# Patient Record
Sex: FEMALE | Race: WHITE | Employment: FULL TIME | URBAN - METROPOLITAN AREA
[De-identification: names, ages, dates, MRNs, and addresses within clinical notes are randomized per-mention and may not be internally consistent; named-entity substitution may affect disease eponyms.]

---

## 2017-11-21 ENCOUNTER — HOSPITAL ENCOUNTER (OUTPATIENT)
Dept: SURGERY | Age: 42
Discharge: HOME | End: 2017-11-21
Attending: DENTIST
Payer: COMMERCIAL

## 2017-11-21 VITALS
WEIGHT: 129.9 LBS | TEMPERATURE: 98.7 F | DIASTOLIC BLOOD PRESSURE: 69 MMHG | BODY MASS INDEX: 22.18 KG/M2 | HEIGHT: 64 IN | HEART RATE: 83 BPM | SYSTOLIC BLOOD PRESSURE: 118 MMHG | OXYGEN SATURATION: 97 % | RESPIRATION RATE: 16 BRPM

## 2017-11-21 LAB
ANION GAP SERPL CALC-SCNC: 12 MMOL/L (ref 7–16)
BUN SERPL-MCNC: 13 MG/DL (ref 6–23)
CALCIUM SERPL-MCNC: 9.4 MG/DL (ref 8.3–10.4)
CHLORIDE SERPL-SCNC: 103 MMOL/L (ref 98–107)
CO2 SERPL-SCNC: 24 MMOL/L (ref 21–32)
CREAT SERPL-MCNC: 0.87 MG/DL (ref 0.6–1)
ERYTHROCYTE [DISTWIDTH] IN BLOOD BY AUTOMATED COUNT: 13 % (ref 11.9–14.6)
GLUCOSE SERPL-MCNC: 132 MG/DL (ref 65–100)
HCT VFR BLD AUTO: 39.4 % (ref 35.8–46.3)
HGB BLD-MCNC: 13.2 G/DL (ref 11.7–15.4)
MCH RBC QN AUTO: 28.8 PG (ref 26.1–32.9)
MCHC RBC AUTO-ENTMCNC: 33.5 G/DL (ref 31.4–35)
MCV RBC AUTO: 85.8 FL (ref 79.6–97.8)
PLATELET # BLD AUTO: 266 K/UL (ref 150–450)
PMV BLD AUTO: 9.5 FL (ref 10.8–14.1)
POTASSIUM SERPL-SCNC: 3.7 MMOL/L (ref 3.5–5.1)
RBC # BLD AUTO: 4.59 M/UL (ref 4.05–5.25)
SODIUM SERPL-SCNC: 139 MMOL/L (ref 136–145)
WBC # BLD AUTO: 8.8 K/UL (ref 4.3–11.1)

## 2017-11-21 PROCEDURE — 80048 BASIC METABOLIC PNL TOTAL CA: CPT | Performed by: ANESTHESIOLOGY

## 2017-11-21 PROCEDURE — 85027 COMPLETE CBC AUTOMATED: CPT | Performed by: ANESTHESIOLOGY

## 2017-11-21 RX ORDER — LANOLIN ALCOHOL/MO/W.PET/CERES
400 CREAM (GRAM) TOPICAL DAILY
COMMUNITY

## 2017-11-21 NOTE — PERIOP NOTES
Lab results within limits per anesthesia protocol; OK for surgery.      Recent Results (from the past 12 hour(s))   CBC W/O DIFF    Collection Time: 11/21/17  3:52 PM   Result Value Ref Range    WBC 8.8 4.3 - 11.1 K/uL    RBC 4.59 4.05 - 5.25 M/uL    HGB 13.2 11.7 - 15.4 g/dL    HCT 39.4 35.8 - 46.3 %    MCV 85.8 79.6 - 97.8 FL    MCH 28.8 26.1 - 32.9 PG    MCHC 33.5 31.4 - 35.0 g/dL    RDW 13.0 11.9 - 14.6 %    PLATELET 729 669 - 264 K/uL    MPV 9.5 (L) 10.8 - 44.2 FL   METABOLIC PANEL, BASIC    Collection Time: 11/21/17  3:52 PM   Result Value Ref Range    Sodium 139 136 - 145 mmol/L    Potassium 3.7 3.5 - 5.1 mmol/L    Chloride 103 98 - 107 mmol/L    CO2 24 21 - 32 mmol/L    Anion gap 12 7 - 16 mmol/L    Glucose 132 (H) 65 - 100 mg/dL    BUN 13 6 - 23 MG/DL    Creatinine 0.87 0.6 - 1.0 MG/DL    GFR est AA >60 >60 ml/min/1.73m2    GFR est non-AA >60 >60 ml/min/1.73m2    Calcium 9.4 8.3 - 10.4 MG/DL

## 2017-11-21 NOTE — PERIOP NOTES
Patient verified name, , and surgery as listed in [facility]. Type 3 surgery, PAT walk in assessment complete. Labs per surgeon: no orders. Labs per anesthesia protocol: cbc, bmp; results- pending. EKG: none needed per anesthesia guidelines. Hibiclens and instructions given per hospital policy. Patient provided with and instructed on educational handouts including Guide to Surgery, Pain Management, Hand Hygiene, Blood Transfusion Education, and [facility] Brochure. Patient answered medical/surgical history questions at their best of ability. All prior to admission medications documented in [facility]. Original medication prescription bottle not visualized during patient appointment. Patient instructed to hold all vitamins 7 days prior to surgery and NSAIDS 5 days prior to surgery, patient verbalized understanding. Medications to be held: vitamins. Patient instructed to continue previous medications as prescribed prior to surgery and to take the following medications the day of surgery according to anesthesia guidelines with a small sip of water: inhaler. Patient teach back successful and patient demonstrates knowledge of instructions.

## 2017-11-28 ENCOUNTER — ANESTHESIA EVENT (OUTPATIENT)
Dept: SURGERY | Age: 42
DRG: 132 | End: 2017-11-28
Payer: COMMERCIAL

## 2017-11-28 RX ORDER — SODIUM CHLORIDE 0.9 % (FLUSH) 0.9 %
5-10 SYRINGE (ML) INJECTION EVERY 8 HOURS
Status: CANCELLED | OUTPATIENT
Start: 2017-11-28

## 2017-11-28 RX ORDER — MIDAZOLAM HYDROCHLORIDE 1 MG/ML
2 INJECTION, SOLUTION INTRAMUSCULAR; INTRAVENOUS ONCE
Status: CANCELLED | OUTPATIENT
Start: 2017-11-28 | End: 2017-11-28

## 2017-11-28 RX ORDER — SODIUM CHLORIDE 0.9 % (FLUSH) 0.9 %
5-10 SYRINGE (ML) INJECTION AS NEEDED
Status: CANCELLED | OUTPATIENT
Start: 2017-11-28

## 2017-11-28 RX ORDER — FENTANYL CITRATE 50 UG/ML
100 INJECTION, SOLUTION INTRAMUSCULAR; INTRAVENOUS ONCE
Status: CANCELLED | OUTPATIENT
Start: 2017-11-28 | End: 2017-11-28

## 2017-11-28 RX ORDER — DEXAMETHASONE SODIUM PHOSPHATE 100 MG/10ML
10 INJECTION INTRAMUSCULAR; INTRAVENOUS
Status: CANCELLED | OUTPATIENT
Start: 2017-11-29

## 2017-11-28 NOTE — H&P
OMFS H&P Note    Lloyd Zavala is a very sweet 38year old white female that I have known for many years. She was originally sent to me by her Orthodontist, Dr Genaro Worley., back in 2009 for evaluation and treatment of her skeletal discrepancies. Secondary to the nature of the procedure and the eventual hospital stay, it is best to treat her with GA in the OR.       PMHx: Asthma, Seasonal Allergies  PSHx: Right arm radius fracture in 2013, Gingival grafting, Childbirth X 2  Meds: Zoloft, Zyrtec, Albuterol INH, ASA 81 mg, Methylfolate, Vit D  All: NKDA  SHx: Wine socially - denies tobacco and PSA  FHx: HTN      PE: Awake, alert and oriented white female of equal proportion - in NAD at the time of her examination      HEENT: Obvious facial asymmetry, opens mouth wide, extends neck well, uvula visualized  CV:  RRR  Lungs:  CTA and equal bilaterally  Abd:  Soft, NT/ND with BS  Ext:  Motor 5/5 throughout  Neuro:  CN V and VII are intact grossly      A/P: Lloyd Zavala is a 38year old white female with skeletal asymmetries, maxillary hypoplasia, and mandibular hyperplasia    Plan for GA in the OR for [place] and BSSRO - eventual Inpatient/two night stay  Cleocin 600 mg IV and Decadron 10 mg IV OCTOR  Anesthesia to pre-op      Please call with questions - [phone]          DD

## 2017-11-29 ENCOUNTER — ANESTHESIA (OUTPATIENT)
Dept: SURGERY | Age: 42
DRG: 132 | End: 2017-11-29
Payer: COMMERCIAL

## 2017-11-29 ENCOUNTER — HOSPITAL ENCOUNTER (INPATIENT)
Age: 42
LOS: 2 days | Discharge: HOME | DRG: 132 | End: 2017-12-01
Attending: DENTIST | Admitting: DENTIST
Payer: COMMERCIAL

## 2017-11-29 PROBLEM — M26.11 MAXILLARY ASYMMETRY: Status: ACTIVE | Noted: 2017-11-29

## 2017-11-29 LAB — HCG UR QL: NEGATIVE

## 2017-11-29 PROCEDURE — 77030020269 HC MISC IMPL: Performed by: DENTIST

## 2017-11-29 PROCEDURE — C1713 ANCHOR/SCREW BN/BN,TIS/BN: HCPCS | Performed by: DENTIST

## 2017-11-29 PROCEDURE — 81025 URINE PREGNANCY TEST: CPT

## 2017-11-29 PROCEDURE — 77030006812 HC BLD SAW RECIP STRY -B: Performed by: DENTIST

## 2017-11-29 PROCEDURE — 76060000040 HC ANESTHESIA 4.5 TO 5 HR: Performed by: DENTIST

## 2017-11-29 PROCEDURE — 74011250637 HC RX REV CODE- 250/637: Performed by: DENTIST

## 2017-11-29 PROCEDURE — 65270000029 HC RM PRIVATE

## 2017-11-29 PROCEDURE — 74011250636 HC RX REV CODE- 250/636: Performed by: ANESTHESIOLOGY

## 2017-11-29 PROCEDURE — 77030002986 HC SUT PROL J&J -A: Performed by: DENTIST

## 2017-11-29 PROCEDURE — 77030002888 HC SUT CHRMC J&J -A: Performed by: DENTIST

## 2017-11-29 PROCEDURE — 77030008683 HC TU ET CUF COVD -A: Performed by: ANESTHESIOLOGY

## 2017-11-29 PROCEDURE — 77030004413 HC BUR OVL STRY -B: Performed by: DENTIST

## 2017-11-29 PROCEDURE — 77030034849: Performed by: DENTIST

## 2017-11-29 PROCEDURE — 74011250636 HC RX REV CODE- 250/636: Performed by: DENTIST

## 2017-11-29 PROCEDURE — 77030020782 HC GWN BAIR PAWS FLX 3M -B: Performed by: ANESTHESIOLOGY

## 2017-11-29 PROCEDURE — 74011250636 HC RX REV CODE- 250/636

## 2017-11-29 PROCEDURE — 77030018836 HC SOL IRR NACL ICUM -A: Performed by: DENTIST

## 2017-11-29 PROCEDURE — 74011000250 HC RX REV CODE- 250: Performed by: DENTIST

## 2017-11-29 PROCEDURE — 74011000258 HC RX REV CODE- 258: Performed by: DENTIST

## 2017-11-29 PROCEDURE — 77030004385 HC BUR FISS STRY -A: Performed by: DENTIST

## 2017-11-29 PROCEDURE — 77030002936 HC SUT MERS J&J -A: Performed by: DENTIST

## 2017-11-29 PROCEDURE — 77030004368 HC BUR DENT STRY -B: Performed by: DENTIST

## 2017-11-29 PROCEDURE — 77030010514 HC APPL CLP LIG COVD -B: Performed by: DENTIST

## 2017-11-29 PROCEDURE — 77030032490 HC SLV COMPR SCD KNE COVD -B: Performed by: DENTIST

## 2017-11-29 PROCEDURE — 77030028843 HC ELECTRD CAUT TIP MEGA -B: Performed by: DENTIST

## 2017-11-29 PROCEDURE — 77030016570 HC BLNKT BAIR HGGR 3M -B: Performed by: ANESTHESIOLOGY

## 2017-11-29 PROCEDURE — 74011000250 HC RX REV CODE- 250: Performed by: ANESTHESIOLOGY

## 2017-11-29 PROCEDURE — 77030011640 HC PAD GRND REM COVD -A: Performed by: DENTIST

## 2017-11-29 PROCEDURE — 74011250637 HC RX REV CODE- 250/637: Performed by: ANESTHESIOLOGY

## 2017-11-29 PROCEDURE — 76210000016 HC OR PH I REC 1 TO 1.5 HR: Performed by: DENTIST

## 2017-11-29 PROCEDURE — 77030003887 HC BIT DRL TWST KLSM -B: Performed by: DENTIST

## 2017-11-29 PROCEDURE — 77030002996 HC SUT SLK J&J -A: Performed by: DENTIST

## 2017-11-29 PROCEDURE — 74011000250 HC RX REV CODE- 250

## 2017-11-29 PROCEDURE — 76010000176 HC OR TIME 4.5 TO 5 HR INTENSV-TIER 1: Performed by: DENTIST

## 2017-11-29 PROCEDURE — 77030031139 HC SUT VCRL2 J&J -A: Performed by: DENTIST

## 2017-11-29 DEVICE — SCREW BNE L11MM DIA2MM TI NONLOCKING MAXDRIVE: Type: IMPLANTABLE DEVICE | Site: MOUTH | Status: FUNCTIONAL

## 2017-11-29 DEVICE — SCREW BNE L5MM DIA2.3MM TI EMGCY SELF RET FOR OSTEOSYN: Type: IMPLANTABLE DEVICE | Site: MOUTH | Status: FUNCTIONAL

## 2017-11-29 DEVICE — SCREW BNE L7MM DIA2MM NEURO TI LEV 1 SELF RET DRL FREE: Type: IMPLANTABLE DEVICE | Site: MOUTH | Status: FUNCTIONAL

## 2017-11-29 DEVICE — IMPLANTABLE DEVICE: Type: IMPLANTABLE DEVICE | Site: MOUTH | Status: FUNCTIONAL

## 2017-11-29 DEVICE — SCREW BNE L9MM DIA2MM TI NONLOCKING MAXDRIVE: Type: IMPLANTABLE DEVICE | Site: MOUTH | Status: FUNCTIONAL

## 2017-11-29 DEVICE — WIRE ORTH 1.1MM DIA 229MM SMOOTH DBL BAYNT TIP S STL K: Type: IMPLANTABLE DEVICE | Site: MOUTH | Status: FUNCTIONAL

## 2017-11-29 DEVICE — SCREW BNE L13MM DIA2MM TI NONLOCKING MAXDRIVE: Type: IMPLANTABLE DEVICE | Site: MOUTH | Status: FUNCTIONAL

## 2017-11-29 DEVICE — SCREW BNE L5MM DIA2MM NEURO TI LEV 1 SELF RET DRL FREE: Type: IMPLANTABLE DEVICE | Site: MOUTH | Status: FUNCTIONAL

## 2017-11-29 RX ORDER — LABETALOL HYDROCHLORIDE 5 MG/ML
INJECTION, SOLUTION INTRAVENOUS AS NEEDED
Status: DISCONTINUED | OUTPATIENT
Start: 2017-11-29 | End: 2017-11-29 | Stop reason: HOSPADM

## 2017-11-29 RX ORDER — IBUPROFEN 400 MG/1
400 TABLET ORAL
Status: DISCONTINUED | OUTPATIENT
Start: 2017-11-29 | End: 2017-12-01 | Stop reason: HOSPADM

## 2017-11-29 RX ORDER — ACETAMINOPHEN 500 MG
1000 TABLET ORAL ONCE
Status: DISCONTINUED | OUTPATIENT
Start: 2017-11-29 | End: 2017-11-29

## 2017-11-29 RX ORDER — NEOSTIGMINE METHYLSULFATE 1 MG/ML
INJECTION INTRAVENOUS AS NEEDED
Status: DISCONTINUED | OUTPATIENT
Start: 2017-11-29 | End: 2017-11-29 | Stop reason: HOSPADM

## 2017-11-29 RX ORDER — KETOROLAC TROMETHAMINE 30 MG/ML
INJECTION, SOLUTION INTRAMUSCULAR; INTRAVENOUS AS NEEDED
Status: DISCONTINUED | OUTPATIENT
Start: 2017-11-29 | End: 2017-11-29 | Stop reason: HOSPADM

## 2017-11-29 RX ORDER — OXYMETAZOLINE HCL 0.05 %
2 SPRAY, NON-AEROSOL (ML) NASAL EVERY 12 HOURS
Status: DISCONTINUED | OUTPATIENT
Start: 2017-11-29 | End: 2017-12-01 | Stop reason: HOSPADM

## 2017-11-29 RX ORDER — ONDANSETRON 2 MG/ML
INJECTION INTRAMUSCULAR; INTRAVENOUS AS NEEDED
Status: DISCONTINUED | OUTPATIENT
Start: 2017-11-29 | End: 2017-11-29 | Stop reason: HOSPADM

## 2017-11-29 RX ORDER — ALBUTEROL SULFATE 90 UG/1
1 AEROSOL, METERED RESPIRATORY (INHALATION)
Status: CANCELLED | OUTPATIENT
Start: 2017-11-29

## 2017-11-29 RX ORDER — FLUMAZENIL 0.1 MG/ML
0.2 INJECTION INTRAVENOUS
Status: DISCONTINUED | OUTPATIENT
Start: 2017-11-29 | End: 2017-11-29

## 2017-11-29 RX ORDER — HYDROMORPHONE HYDROCHLORIDE 2 MG/ML
0.5 INJECTION, SOLUTION INTRAMUSCULAR; INTRAVENOUS; SUBCUTANEOUS
Status: DISCONTINUED | OUTPATIENT
Start: 2017-11-29 | End: 2017-11-29

## 2017-11-29 RX ORDER — MIDAZOLAM HYDROCHLORIDE 1 MG/ML
2 INJECTION, SOLUTION INTRAMUSCULAR; INTRAVENOUS
Status: COMPLETED | OUTPATIENT
Start: 2017-11-29 | End: 2017-11-29

## 2017-11-29 RX ORDER — BUPIVACAINE HYDROCHLORIDE 5 MG/ML
INJECTION, SOLUTION EPIDURAL; INTRACAUDAL AS NEEDED
Status: DISCONTINUED | OUTPATIENT
Start: 2017-11-29 | End: 2017-11-29 | Stop reason: HOSPADM

## 2017-11-29 RX ORDER — LIDOCAINE HYDROCHLORIDE 20 MG/ML
INJECTION, SOLUTION EPIDURAL; INFILTRATION; INTRACAUDAL; PERINEURAL AS NEEDED
Status: DISCONTINUED | OUTPATIENT
Start: 2017-11-29 | End: 2017-11-29 | Stop reason: HOSPADM

## 2017-11-29 RX ORDER — LIDOCAINE HYDROCHLORIDE 10 MG/ML
0.1 INJECTION INFILTRATION; PERINEURAL AS NEEDED
Status: DISCONTINUED | OUTPATIENT
Start: 2017-11-29 | End: 2017-11-29 | Stop reason: HOSPADM

## 2017-11-29 RX ORDER — ALBUTEROL SULFATE 90 UG/1
1 AEROSOL, METERED RESPIRATORY (INHALATION)
COMMUNITY

## 2017-11-29 RX ORDER — ONDANSETRON 2 MG/ML
4 INJECTION INTRAMUSCULAR; INTRAVENOUS
Status: DISCONTINUED | OUTPATIENT
Start: 2017-11-29 | End: 2017-12-01 | Stop reason: HOSPADM

## 2017-11-29 RX ORDER — ISOPROPYL ALCOHOL 70 ML/100ML
LIQUID TOPICAL AS NEEDED
Status: DISCONTINUED | OUTPATIENT
Start: 2017-11-29 | End: 2017-11-29 | Stop reason: HOSPADM

## 2017-11-29 RX ORDER — EPINEPHRINE 1 MG/ML
INJECTION, SOLUTION, CONCENTRATE INTRAVENOUS AS NEEDED
Status: DISCONTINUED | OUTPATIENT
Start: 2017-11-29 | End: 2017-11-29 | Stop reason: HOSPADM

## 2017-11-29 RX ORDER — HYDROMORPHONE HYDROCHLORIDE 2 MG/ML
1 INJECTION, SOLUTION INTRAMUSCULAR; INTRAVENOUS; SUBCUTANEOUS
Status: DISCONTINUED | OUTPATIENT
Start: 2017-11-29 | End: 2017-12-01 | Stop reason: HOSPADM

## 2017-11-29 RX ORDER — OXYCODONE HYDROCHLORIDE 5 MG/1
5 TABLET ORAL
Status: DISCONTINUED | OUTPATIENT
Start: 2017-11-29 | End: 2017-11-29

## 2017-11-29 RX ORDER — DEXTROSE MONOHYDRATE AND SODIUM CHLORIDE 5; .45 G/100ML; G/100ML
75 INJECTION, SOLUTION INTRAVENOUS CONTINUOUS
Status: DISCONTINUED | OUTPATIENT
Start: 2017-11-29 | End: 2017-11-30

## 2017-11-29 RX ORDER — OXYMETAZOLINE HCL 0.05 %
SPRAY, NON-AEROSOL (ML) NASAL AS NEEDED
Status: DISCONTINUED | OUTPATIENT
Start: 2017-11-29 | End: 2017-11-29 | Stop reason: HOSPADM

## 2017-11-29 RX ORDER — DIPHENHYDRAMINE HYDROCHLORIDE 50 MG/ML
12.5 INJECTION, SOLUTION INTRAMUSCULAR; INTRAVENOUS
Status: DISCONTINUED | OUTPATIENT
Start: 2017-11-29 | End: 2017-11-29

## 2017-11-29 RX ORDER — OXYCODONE HYDROCHLORIDE 5 MG/1
10 TABLET ORAL
Status: DISCONTINUED | OUTPATIENT
Start: 2017-11-29 | End: 2017-11-29

## 2017-11-29 RX ORDER — HYDROCODONE BITARTRATE AND ACETAMINOPHEN 7.5; 325 MG/15ML; MG/15ML
10 SOLUTION ORAL
Status: DISCONTINUED | OUTPATIENT
Start: 2017-11-29 | End: 2017-12-01 | Stop reason: HOSPADM

## 2017-11-29 RX ORDER — SODIUM CHLORIDE 0.9 % (FLUSH) 0.9 %
5-10 SYRINGE (ML) INJECTION AS NEEDED
Status: DISCONTINUED | OUTPATIENT
Start: 2017-11-29 | End: 2017-11-29

## 2017-11-29 RX ORDER — OXYMETAZOLINE HCL 0.05 %
2 SPRAY, NON-AEROSOL (ML) NASAL ONCE
Status: COMPLETED | OUTPATIENT
Start: 2017-11-29 | End: 2017-11-29

## 2017-11-29 RX ORDER — ACETAMINOPHEN 10 MG/ML
INJECTION, SOLUTION INTRAVENOUS AS NEEDED
Status: DISCONTINUED | OUTPATIENT
Start: 2017-11-29 | End: 2017-11-29 | Stop reason: HOSPADM

## 2017-11-29 RX ORDER — DEXAMETHASONE SODIUM PHOSPHATE 100 MG/10ML
10 INJECTION INTRAMUSCULAR; INTRAVENOUS EVERY 8 HOURS
Status: COMPLETED | OUTPATIENT
Start: 2017-11-29 | End: 2017-11-30

## 2017-11-29 RX ORDER — SODIUM CHLORIDE, SODIUM LACTATE, POTASSIUM CHLORIDE, CALCIUM CHLORIDE 600; 310; 30; 20 MG/100ML; MG/100ML; MG/100ML; MG/100ML
100 INJECTION, SOLUTION INTRAVENOUS CONTINUOUS
Status: DISCONTINUED | OUTPATIENT
Start: 2017-11-29 | End: 2017-11-29 | Stop reason: HOSPADM

## 2017-11-29 RX ORDER — FENTANYL CITRATE 50 UG/ML
INJECTION, SOLUTION INTRAMUSCULAR; INTRAVENOUS AS NEEDED
Status: DISCONTINUED | OUTPATIENT
Start: 2017-11-29 | End: 2017-11-29 | Stop reason: HOSPADM

## 2017-11-29 RX ORDER — PROPOFOL 10 MG/ML
INJECTION, EMULSION INTRAVENOUS AS NEEDED
Status: DISCONTINUED | OUTPATIENT
Start: 2017-11-29 | End: 2017-11-29 | Stop reason: HOSPADM

## 2017-11-29 RX ORDER — GLYCOPYRROLATE 0.2 MG/ML
INJECTION INTRAMUSCULAR; INTRAVENOUS AS NEEDED
Status: DISCONTINUED | OUTPATIENT
Start: 2017-11-29 | End: 2017-11-29 | Stop reason: HOSPADM

## 2017-11-29 RX ORDER — NALOXONE HYDROCHLORIDE 0.4 MG/ML
0.2 INJECTION, SOLUTION INTRAMUSCULAR; INTRAVENOUS; SUBCUTANEOUS AS NEEDED
Status: DISCONTINUED | OUTPATIENT
Start: 2017-11-29 | End: 2017-11-29

## 2017-11-29 RX ORDER — SODIUM CHLORIDE, SODIUM LACTATE, POTASSIUM CHLORIDE, CALCIUM CHLORIDE 600; 310; 30; 20 MG/100ML; MG/100ML; MG/100ML; MG/100ML
100 INJECTION, SOLUTION INTRAVENOUS CONTINUOUS
Status: DISCONTINUED | OUTPATIENT
Start: 2017-11-29 | End: 2017-11-29

## 2017-11-29 RX ORDER — ROCURONIUM BROMIDE 10 MG/ML
INJECTION, SOLUTION INTRAVENOUS AS NEEDED
Status: DISCONTINUED | OUTPATIENT
Start: 2017-11-29 | End: 2017-11-29 | Stop reason: HOSPADM

## 2017-11-29 RX ORDER — DEXAMETHASONE SODIUM PHOSPHATE 4 MG/ML
INJECTION, SOLUTION INTRA-ARTICULAR; INTRALESIONAL; INTRAMUSCULAR; INTRAVENOUS; SOFT TISSUE AS NEEDED
Status: DISCONTINUED | OUTPATIENT
Start: 2017-11-29 | End: 2017-11-29 | Stop reason: HOSPADM

## 2017-11-29 RX ORDER — TRIAMCINOLONE ACETONIDE 55 UG/1
SPRAY, METERED NASAL
COMMUNITY
Start: 2016-02-26 | End: 2019-03-08

## 2017-11-29 RX ADMIN — MIDAZOLAM HYDROCHLORIDE 2 MG: 1 INJECTION, SOLUTION INTRAMUSCULAR; INTRAVENOUS at 08:12

## 2017-11-29 RX ADMIN — NEOSTIGMINE METHYLSULFATE 3 MG: 1 INJECTION INTRAVENOUS at 12:33

## 2017-11-29 RX ADMIN — FENTANYL CITRATE 50 MCG: 50 INJECTION, SOLUTION INTRAMUSCULAR; INTRAVENOUS at 09:24

## 2017-11-29 RX ADMIN — SODIUM CHLORIDE, SODIUM LACTATE, POTASSIUM CHLORIDE, AND CALCIUM CHLORIDE: 600; 310; 30; 20 INJECTION, SOLUTION INTRAVENOUS at 09:31

## 2017-11-29 RX ADMIN — LIDOCAINE HYDROCHLORIDE 100 MG: 20 INJECTION, SOLUTION EPIDURAL; INFILTRATION; INTRACAUDAL; PERINEURAL at 08:54

## 2017-11-29 RX ADMIN — DEXTROSE MONOHYDRATE AND SODIUM CHLORIDE 75 ML/HR: 5; .45 INJECTION, SOLUTION INTRAVENOUS at 16:07

## 2017-11-29 RX ADMIN — HYDROMORPHONE HYDROCHLORIDE 1 MG: 2 INJECTION, SOLUTION INTRAMUSCULAR; INTRAVENOUS; SUBCUTANEOUS at 21:46

## 2017-11-29 RX ADMIN — Medication 10 ML: at 16:09

## 2017-11-29 RX ADMIN — FENTANYL CITRATE 25 MCG: 50 INJECTION, SOLUTION INTRAMUSCULAR; INTRAVENOUS at 11:56

## 2017-11-29 RX ADMIN — FENTANYL CITRATE 50 MCG: 50 INJECTION, SOLUTION INTRAMUSCULAR; INTRAVENOUS at 09:41

## 2017-11-29 RX ADMIN — FENTANYL CITRATE 50 MCG: 50 INJECTION, SOLUTION INTRAMUSCULAR; INTRAVENOUS at 09:34

## 2017-11-29 RX ADMIN — KETOROLAC TROMETHAMINE 30 MG: 30 INJECTION, SOLUTION INTRAMUSCULAR; INTRAVENOUS at 12:33

## 2017-11-29 RX ADMIN — DEXAMETHASONE SODIUM PHOSPHATE 10 MG: 10 INJECTION INTRAMUSCULAR; INTRAVENOUS at 16:06

## 2017-11-29 RX ADMIN — Medication 2 SPRAY: at 21:00

## 2017-11-29 RX ADMIN — PROPOFOL 200 MG: 10 INJECTION, EMULSION INTRAVENOUS at 08:54

## 2017-11-29 RX ADMIN — DEXAMETHASONE SODIUM PHOSPHATE 10 MG: 10 INJECTION INTRAMUSCULAR; INTRAVENOUS at 21:45

## 2017-11-29 RX ADMIN — FENTANYL CITRATE 50 MCG: 50 INJECTION, SOLUTION INTRAMUSCULAR; INTRAVENOUS at 09:10

## 2017-11-29 RX ADMIN — FENTANYL CITRATE 25 MCG: 50 INJECTION, SOLUTION INTRAMUSCULAR; INTRAVENOUS at 12:07

## 2017-11-29 RX ADMIN — SODIUM CHLORIDE 600 MG: 900 INJECTION, SOLUTION INTRAVENOUS at 16:06

## 2017-11-29 RX ADMIN — SODIUM CHLORIDE, SODIUM LACTATE, POTASSIUM CHLORIDE, AND CALCIUM CHLORIDE: 600; 310; 30; 20 INJECTION, SOLUTION INTRAVENOUS at 08:50

## 2017-11-29 RX ADMIN — CLINDAMYCIN PHOSPHATE 600 MG: 150 INJECTION, SOLUTION, CONCENTRATE INTRAVENOUS at 09:02

## 2017-11-29 RX ADMIN — OXYMETAZOLINE HYDROCHLORIDE 2 SPRAY: 5 SPRAY NASAL at 08:07

## 2017-11-29 RX ADMIN — SODIUM CHLORIDE, SODIUM LACTATE, POTASSIUM CHLORIDE, AND CALCIUM CHLORIDE 100 ML/HR: 600; 310; 30; 20 INJECTION, SOLUTION INTRAVENOUS at 08:06

## 2017-11-29 RX ADMIN — HYDROMORPHONE HYDROCHLORIDE 1 MG: 2 INJECTION, SOLUTION INTRAMUSCULAR; INTRAVENOUS; SUBCUTANEOUS at 17:10

## 2017-11-29 RX ADMIN — ONDANSETRON 4 MG: 2 INJECTION INTRAMUSCULAR; INTRAVENOUS at 12:31

## 2017-11-29 RX ADMIN — LABETALOL HYDROCHLORIDE 10 MG: 5 INJECTION, SOLUTION INTRAVENOUS at 09:59

## 2017-11-29 RX ADMIN — FENTANYL CITRATE 50 MCG: 50 INJECTION, SOLUTION INTRAMUSCULAR; INTRAVENOUS at 08:54

## 2017-11-29 RX ADMIN — DEXAMETHASONE SODIUM PHOSPHATE 10 MG: 4 INJECTION, SOLUTION INTRA-ARTICULAR; INTRALESIONAL; INTRAMUSCULAR; INTRAVENOUS; SOFT TISSUE at 09:13

## 2017-11-29 RX ADMIN — GLYCOPYRROLATE 0.4 MG: 0.2 INJECTION INTRAMUSCULAR; INTRAVENOUS at 12:33

## 2017-11-29 RX ADMIN — HYDROMORPHONE HYDROCHLORIDE 0.5 MG: 2 INJECTION, SOLUTION INTRAMUSCULAR; INTRAVENOUS; SUBCUTANEOUS at 13:52

## 2017-11-29 RX ADMIN — ROCURONIUM BROMIDE 50 MG: 10 INJECTION, SOLUTION INTRAVENOUS at 08:54

## 2017-11-29 RX ADMIN — HYDROMORPHONE HYDROCHLORIDE 0.5 MG: 2 INJECTION, SOLUTION INTRAMUSCULAR; INTRAVENOUS; SUBCUTANEOUS at 13:57

## 2017-11-29 RX ADMIN — ACETAMINOPHEN 1000 MG: 10 INJECTION, SOLUTION INTRAVENOUS at 12:33

## 2017-11-29 RX ADMIN — SODIUM CHLORIDE, SODIUM LACTATE, POTASSIUM CHLORIDE, AND CALCIUM CHLORIDE: 600; 310; 30; 20 INJECTION, SOLUTION INTRAVENOUS at 11:19

## 2017-11-29 RX ADMIN — LIDOCAINE HYDROCHLORIDE 0.1 ML: 10 INJECTION, SOLUTION INFILTRATION; PERINEURAL at 08:06

## 2017-11-29 NOTE — ANESTHESIA PREPROCEDURE EVALUATION
Anesthetic History   No history of anesthetic complications            Review of Systems / Medical History  Patient summary reviewed and pertinent labs reviewed    Pulmonary            Asthma : well controlled       Neuro/Psych   Within defined limits           Cardiovascular                  Exercise tolerance: >4 METS     GI/Hepatic/Renal  Within defined limits              Endo/Other  Within defined limits           Other Findings              Physical Exam    Airway  Mallampati: I  TM Distance: 4 - 6 cm  Neck ROM: normal range of motion   Mouth opening: Normal     Cardiovascular    Rhythm: regular  Rate: normal         Dental         Pulmonary  Breath sounds clear to auscultation               Abdominal         Other Findings            Anesthetic Plan    ASA: 2  Anesthesia type: general          Induction: Intravenous  Anesthetic plan and risks discussed with: Patient and Spouse

## 2017-11-29 NOTE — IP AVS SNAPSHOT
[address] 
[phone] Patient: Ham Mejia MRN: [id number] :1975 My Medications STOP taking these medications   
 sertraline 50 mg tablet Commonly known as:  ZOLOFT  
   
  
  
TAKE these medications as instructed Instructions Each Dose to Equal  
 Morning Noon Evening Bedtime  
 albuterol 90 mcg/actuation inhaler Commonly known as:  PROVENTIL HFA, VENTOLIN HFA, PROAIR HFA Your last dose was: Your next dose is: Take 1 Puff by inhalation. 1 Puff  
    
   
   
   
  
 amoxicillin 250 mg/5 mL suspension Commonly known as:  AMOXIL Your last dose was: Your next dose is: Take 10 mL by mouth three (3) times daily for 7 days. 500 mg  
    
   
   
   
  
 aspirin 81 mg chewable tablet Your last dose was: Your next dose is: Take 81 mg by mouth nightly. 81 mg  
    
   
   
   
  
 docosahexanoic acid 200 mg Cap capsule Commonly known as:  Lidia Griffin Your last dose was: Your next dose is: Take 1 Tab by mouth daily. 1 Tab  
    
   
   
   
  
 folic acid 048 mcg tablet Your last dose was: Your next dose is: Take 400 mcg by mouth daily. 400 mcg HYDROcodone-acetaminophen 0.5-21.7 mg/mL oral solution Commonly known as:  HYCET Your last dose was: Your next dose is: Take 15-30 mL by mouth four (4) times daily as needed for Pain. Max Daily Amount: 60 mg.  
 15-30 mL  
    
   
   
   
  
 ibuprofen 800 mg tablet Commonly known as:  MOTRIN Your last dose was: Your next dose is: Take 1 Tab by mouth every eight (8) hours as needed for Pain (Patient may crush tablet). 800 mg  
    
   
   
   
  
 multivitamin tablet Commonly known as:  ONE A DAY Your last dose was: Your next dose is: Take 1 Tab by mouth daily. 1 Tab  
    
   
   
   
  
 ondansetron 4 mg disintegrating tablet Commonly known as:  ZOFRAN ODT Your last dose was: Your next dose is: Take 1 Tab by mouth every eight (8) hours as needed for Nausea. 4 mg * QVAR 80 mcg/actuation Aero Generic drug:  beclomethasone Your last dose was: Your next dose is: Take 1 Puff by inhalation two (2) times daily as needed. 1 Puff * beclomethasone 80 mcg/actuation Aero Commonly known as:  QVAR Your last dose was: Your next dose is: Take 1 Puff by inhalation. 1 Puff  
    
   
   
   
  
 triamcinolone 55 mcg nasal inhaler Commonly known as:  NASACORT AQ Your last dose was: Your next dose is:    
   
   
 by Nasal route. VITAMIN D3 1,000 unit tablet Generic drug:  cholecalciferol Your last dose was: Your next dose is: Take  by mouth daily. ZENCHENT FE 0.4mg-35mcg(21) and 75 mg (7) Chew Generic drug:  Noreth-Ethinyl Estradiol-Iron Your last dose was: Your next dose is: Take 1 Tab by mouth daily. Active pills only, skip placebos 1 Tab ZyrTEC 10 mg Cap Generic drug:  Cetirizine Your last dose was: Your next dose is: Take 1 Tab by mouth nightly. 1 Tab * Notice: This list has 2 medication(s) that are the same as other medications prescribed for you. Read the directions carefully, and ask your doctor or other care provider to review them with you. Where to Get Your Medications Information on where to get these meds will be given to you by the nurse or doctor. ! Ask your nurse or doctor about these medications  
  amoxicillin 250 mg/5 mL suspension HYDROcodone-acetaminophen 0.5-21.7 mg/mL oral solution  
 ibuprofen 800 mg tablet  
 ondansetron 4 mg disintegrating tablet

## 2017-11-29 NOTE — PROGRESS NOTES
TRANSFER - IN REPORT:    Verbal report received from Siva(name) on Sammi Reyes  being received from [facility]) for routine progression of care      Report consisted of patients Situation, Background, Assessment and   Recommendations(SBAR). Information from the following report(s) SBAR, OR Summary and Procedure Summary was reviewed with the receiving nurse. Opportunity for questions and clarification was provided. Assessment completed upon patients arrival to unit and care assumed.

## 2017-11-29 NOTE — IP AVS SNAPSHOT
[address] [facility] 
 
 
 300 [facility] [address] 
[phone] Patient: Da Rojo MRN: [id number] :1975 About your hospitalization You were admitted on:  2017 You last received care in the:  [facility] 3M You were discharged on:  2017 Why you were hospitalized Your primary diagnosis was:  Not on File Your diagnoses also included:  Maxillary Asymmetry Things You Need To Do (next 8 weeks) Follow up with Alex Diaz MD  
  
Phone:  [phone] [facility]:  [facility] [address] Follow up with Marino Abarca MD  
  
Phone:  [phone] Where:  [address], [address] [place] [address] Avenue, INTERNAL [address] [facility] [facility] [address] Discharge Orders None A check adam indicates which time of day the medication should be taken. My Medications STOP taking these medications   
 sertraline 50 mg tablet Commonly known as:  ZOLOFT  
   
  
  
TAKE these medications as instructed Instructions Each Dose to Equal  
 Morning Noon Evening Bedtime  
 albuterol 90 mcg/actuation inhaler Commonly known as:  PROVENTIL HFA, VENTOLIN HFA, PROAIR HFA Your last dose was: Your next dose is: Take 1 Puff by inhalation. 1 Puff  
    
   
   
   
  
 amoxicillin 250 mg/5 mL suspension Commonly known as:  AMOXIL Your last dose was: Your next dose is: Take 10 mL by mouth three (3) times daily for 7 days. 500 mg  
    
   
   
   
  
 aspirin 81 mg chewable tablet Your last dose was: Your next dose is: Take 81 mg by mouth nightly. 81 mg  
    
   
   
   
  
 docosahexanoic acid 200 mg Cap capsule Commonly known as:  Tanya Estrella Your last dose was: Your next dose is: Take 1 Tab by mouth daily. 1 Tab folic acid 456 mcg tablet Your last dose was: Your next dose is: Take 400 mcg by mouth daily. 400 mcg HYDROcodone-acetaminophen 0.5-21.7 mg/mL oral solution Commonly known as:  HYCET Your last dose was: Your next dose is: Take 15-30 mL by mouth four (4) times daily as needed for Pain. Max Daily Amount: 60 mg.  
 15-30 mL  
    
   
   
   
  
 ibuprofen 800 mg tablet Commonly known as:  MOTRIN Your last dose was: Your next dose is: Take 1 Tab by mouth every eight (8) hours as needed for Pain (Patient may crush tablet). 800 mg  
    
   
   
   
  
 multivitamin tablet Commonly known as:  ONE A DAY Your last dose was: Your next dose is: Take 1 Tab by mouth daily. 1 Tab  
    
   
   
   
  
 ondansetron 4 mg disintegrating tablet Commonly known as:  ZOFRAN ODT Your last dose was: Your next dose is: Take 1 Tab by mouth every eight (8) hours as needed for Nausea. 4 mg * QVAR 80 mcg/actuation Aero Generic drug:  beclomethasone Your last dose was: Your next dose is: Take 1 Puff by inhalation two (2) times daily as needed. 1 Puff * beclomethasone 80 mcg/actuation Aero Commonly known as:  QVAR Your last dose was: Your next dose is: Take 1 Puff by inhalation. 1 Puff  
    
   
   
   
  
 triamcinolone 55 mcg nasal inhaler Commonly known as:  NASACORT AQ Your last dose was: Your next dose is:    
   
   
 by Nasal route. VITAMIN D3 1,000 unit tablet Generic drug:  cholecalciferol Your last dose was: Your next dose is: Take  by mouth daily. ZENCHENT FE 0.4mg-35mcg(21) and 75 mg (7) Chew Generic drug:  Noreth-Ethinyl Estradiol-Iron Your last dose was: Your next dose is: Take 1 Tab by mouth daily. Active pills only, skip placebos 1 Tab ZyrTEC 10 mg Cap Generic drug:  Cetirizine Your last dose was: Your next dose is: Take 1 Tab by mouth nightly. 1 Tab * Notice: This list has 2 medication(s) that are the same as other medications prescribed for you. Read the directions carefully, and ask your doctor or other care provider to review them with you. Where to Get Your Medications Information on where to get these meds will be given to you by the nurse or doctor. ! Ask your nurse or doctor about these medications  
  amoxicillin 250 mg/5 mL suspension HYDROcodone-acetaminophen 0.5-21.7 mg/mL oral solution  
 ibuprofen 800 mg tablet  
 ondansetron 4 mg disintegrating tablet Discharge Instructions DISCHARGE SUMMARY from Nurse The following personal items are in your possession at time of discharge: 
 
Dental Appliances: Other (comment) (braces) Visual Aid: Glasses Home Medications: None Jewelry: None Clothing: Shirt, Pants, Footwear, Undergarments Other Valuables: [employer] PATIENT INSTRUCTIONS: 
 
After general anesthesia or intravenous sedation, for 24 hours or while taking prescription Narcotics: · Limit your activities · Do not drive and operate hazardous machinery · Do not make important personal or business decisions · Do  not drink alcoholic beverages · If you have not urinated within 8 hours after discharge, please contact your surgeon on call. Report the following to your surgeon: 
· Excessive pain, swelling, redness or odor of or around the surgical area · Temperature over 100.5 · Nausea and vomiting lasting longer than 4 hours or if unable to take medications · Any signs of decreased circulation or nerve impairment to extremity: change in color, persistent  numbness, tingling, coldness or increase pain · Any questions What to do at Home: 
Recommended activity: Activity as tolerated, per MD 
 
If you experience any of the following symptoms fever>101, pain unrelieved with medication, nausea/vomiting, shortness of breath, dizziness/fainting, chest pain. , please follow up with your doctor. *  Please give a list of your current medications to your Primary Care Provider. *  Please update this list whenever your medications are discontinued, doses are 
    changed, or new medications (including over-the-counter products) are added. *  Please carry medication information at all times in case of emergency situations. These are general instructions for a healthy lifestyle: No smoking/ No tobacco products/ Avoid exposure to second hand smoke Surgeon General's Warning:  Quitting smoking now greatly reduces serious risk to your health. Obesity, smoking, and sedentary lifestyle greatly increases your risk for illness A healthy diet, regular physical exercise & weight monitoring are important for maintaining a healthy lifestyle You may be retaining fluid if you have a history of heart failure or if you experience any of the following symptoms:  Weight gain of 3 pounds or more overnight or 5 pounds in a week, increased swelling in our hands or feet or shortness of breath while lying flat in bed. Please call your doctor as soon as you notice any of these symptoms; do not wait until your next office visit. Recognize signs and symptoms of STROKE: 
 
F-face looks uneven A-arms unable to move or move unevenly S-speech slurred or non-existent T-time-call 911 as soon as signs and symptoms begin-DO NOT go Back to bed or wait to see if you get better-TIME IS BRAIN. Warning Signs of HEART ATTACK Call 911 if you have these symptoms: ? Chest discomfort. Most heart attacks involve discomfort in the center of the chest that lasts more than a few minutes, or that goes away and comes back. It can feel like uncomfortable pressure, squeezing, fullness, or pain. ? Discomfort in other areas of the upper body. Symptoms can include pain or discomfort in one or both arms, the back, neck, jaw, or stomach. ? Shortness of breath with or without chest discomfort. ? Other signs may include breaking out in a cold sweat, nausea, or lightheadedness. Don't wait more than five minutes to call [address]! Fast action can save your life. Calling 911 is almost always the fastest way to get lifesaving treatment. Emergency Medical Services staff can begin treatment when they arrive  up to an hour sooner than if someone gets to the hospital by car. The discharge information has been reviewed with the patient. The patient verbalized understanding. Discharge medications reviewed with the patient and appropriate educational materials and side effects teaching were provided. Introducing [facility] SERVICES! Dear Lloyd Zavala: Thank you for requesting a Yesweplay account. Our records indicate that you already have an active Yesweplay account. You can access your account anytime at [email or web address] Did you know that you can access your hospital and ER discharge instructions at any time in Yesweplay? You can also review all of your test results from your hospital stay or ER visit. Additional Information If you have questions, please visit the Frequently Asked Questions section of the Yesweplay website at [email or web address] Remember, Yesweplay is NOT to be used for urgent needs. For medical emergencies, dial 911. Now available from your iPhone and Android! Providers Seen During Your Hospitalization Provider Specialty Primary office phone Dmitriy Swenson MD Oral Surgery [phone] Immunizations Administered for This Admission Name Date Influenza Vaccine (Quad) PF 11/30/2017 Your Primary Care Physician (PCP) Primary Care Physician Office Phone Office Fax [address] [phone] You are allergic to the following Allergen Reactions Celery Anaphylaxis In combination with exercise Inhalants Unknown (comments) Zenon Ramos Recent Documentation Height Weight BMI OB Status Smoking Status 1.626 m 58.5 kg 22.14 kg/m2 Having regular periods Never Smoker Emergency Contacts Name Discharge Info Relation Home Work Mobile Ismael Reyes DISCHARGE CAREGIVER [3] Spouse [3]   [phone] Patient Belongings The following personal items are in your possession at time of discharge: 
  Dental Appliances: Other (comment) (braces)  Visual Aid: Glasses      Home Medications: None   Jewelry: None  Clothing: Shirt, Pants, Footwear, Undergarments    Other Valuables: Eyeglasses Discharge Instructions Attachments/References FACIAL TRAUMA REPAIR: POST-OP (ENGLISH) Patient Handouts Facial Trauma Repair: What to Expect at Home Your Recovery Facial trauma repair is surgery to fix an injury to the face or jaw. The surgery may have been done to stop bleeding, repair damaged tissue, or fix broken bones. Your face may be swollen and bruised. It may take 5 to 7 days for the swelling to go down, and 10 to 14 days for the bruising to fade. It may be hard to eat at first. 
If you have stitches, the doctor may need to remove them about a week after surgery. It will probably take a few months for you to heal after surgery. Your face may look different than it did before your injury. Sometimes more surgery is needed later to help make your face look as close to how it did before the injury as possible. When you can return to work depends on the injury you had and what type of work you do. You may be able to go back to work in 1 to 2 weeks. This care sheet gives you a general idea about how long it will take for you to recover. But each person recovers at a different pace. Follow the steps below to get better as quickly as possible. How can you care for yourself at home? Activity ? · Rest when you feel tired. Getting enough sleep will help you recover. Sleep with your head up by using two or three pillows. You can also try to sleep with your head up in a reclining chair. ? · Avoid any activity that might re-injure your face or jaw, until your doctor says it is okay. ? · Follow your doctor's instructions for cleaning your teeth and mouth. ? · Ask your doctor when you can drive again. ? · You will probably need to take at least 1 to 2 weeks off from work. But you may need to take longer off work, depending on your injury and the type of work you do. Diet ? · Follow your doctor's advice about what you can eat. You may need to eat a soft diet, or you may have to drink your meals through a straw. ? · Drink plenty of fluids to avoid becoming dehydrated. Medicines ? · Your doctor will tell you if and when you can restart your medicines. He or she will also give you instructions about taking any new medicines. ? · If you take blood thinners, such as warfarin (Coumadin), clopidogrel (Plavix), or aspirin, be sure to talk to your doctor. He or she will tell you if and when to start taking those medicines again. Make sure that you understand exactly what your doctor wants you to do. ? · Be safe with medicines. Take pain medicines exactly as directed. ¨ If the doctor gave you a prescription medicine for pain, take it as prescribed. ¨ If you are not taking a prescription pain medicine, ask your doctor if you can take an over-the-counter medicine. ? · If you think your pain medicine is making you sick to your stomach: 
¨ Take your pain medicine after meals (unless your doctor has told you not to). ¨ Ask your doctor for a different pain medicine. ? · If your doctor prescribed antibiotics, take them as directed. Do not stop taking them just because you feel better. You need to take the full course of antibiotics. Incision care ? · If you have an incision, or cuts or scrapes on your face, wash the area daily with warm, soapy water, and pat it dry. ? · Your doctor may give you other instructions about how to care for your incision. Follow your doctor's instructions exactly. ?Ice ? · Put ice or a cold pack on your face or jaw for 10 to 20 minutes at a time. Try to do this 3 or more times a day for 10 to 20 minutes at a time. Put a thin cloth between the ice and your skin. Other instructions ? · If your jaw is wired shut, keep wire cutters with you at all times in case you throw up. Your doctor will show you how to use them. Follow-up care is a key part of your treatment and safety. Be sure to make and go to all appointments, and call your doctor if you are having problems. It's also a good idea to know your test results and keep a list of the medicines you take. When should you call for help? Call 911 anytime you think you may need emergency care. For example, call if: 
? · You passed out (lost consciousness). ? · You have severe trouble breathing. ? · You have sudden chest pain and shortness of breath, or you cough up blood. ?Call your doctor now or seek immediate medical care if: 
? · You have pain that does not get better after you take pain medicine. ? · You have loose stitches, or your incision comes open. ? · You are bleeding from the incision. ? · You have signs of infection, such as: 
¨ Increased pain, swelling, warmth, or redness. ¨ Red streaks leading from the incision. ¨ Pus draining from the incision. ¨ A fever. ? · You have signs of a blood clot in your leg, such as: 
¨ Pain in your calf, back of the knee, thigh, or groin. ¨ Redness and swelling in your leg or groin. ? · You have trouble talking or swallowing. ? · Your mouth is bleeding. ? Watch closely for any changes in your health, and be sure to contact your doctor if: 
? · You do not get better as expected. Where can you learn more? Go to http://roberto carlos-robbi.info/. Enter I429 in the search box to learn more about \"Facial Trauma Repair: What to Expect at Home. \" Current as of: October 13, 2016 Content Version: 11.4 © 8507-8338 Healthwise, Inc[employer]. Care instructions adapted under license by [employer] (which disclaims liability or warranty for this information). If you have questions about a medical condition or this instruction, always ask your healthcare professional. [address] any warranty or liability for your use of this information. Please provide this summary of care documentation to your next provider. Signatures-by signing, you are acknowledging that this After Visit Summary has been reviewed with you and you have received a copy. Patient Signature:  ____________________________________________________________ Date:  ____________________________________________________________  
  
Annalise Allen Provider Signature:  ____________________________________________________________ Date:  ____________________________________________________________

## 2017-11-29 NOTE — PERIOP NOTES
Attempted to update pt's  on surgery progress, but he left his number with the  in the surgery waiting area. Attempted to call his number, but it went to voicemail and the mailbox was full and could not receive any messages.  Teodoro Ge

## 2017-11-29 NOTE — ANESTHESIA POSTPROCEDURE EVALUATION
Post-Anesthesia Evaluation and Assessment    Patient: Army Moncada MRN: [id number]  SSN: [id number]    YOB: 1975  Age: 38 y.o. Sex: female       Cardiovascular Function/Vital Signs  Visit Vitals    /58    Pulse 80    Temp 37.5 °C (99.5 °F)    Resp 16    Ht 5' 4\" (1.626 m)    Wt 58.5 kg (129 lb)    SpO2 95%    BMI 22.14 kg/m2       Patient is status post general anesthesia for Procedure(s):  MAXILLARY LEFORTE I OSTEOTOMY  BILATERAL SAGITTAL SPLIT MANDIBULAR OSTEOTOMY. Nausea/Vomiting: None    Postoperative hydration reviewed and adequate. Pain:  Pain Scale 1: Numeric (0 - 10) (11/29/17 1520)  Pain Intensity 1: 0 (11/29/17 1427)   Managed    Neurological Status:   Neuro (WDL): Exceptions to WDL (11/29/17 1417)  Neuro  Neurologic State: Appropriate for age; Alert (11/29/17 1520)  Orientation Level: Oriented X4 (11/29/17 1520)   At baseline    Mental Status and Level of Consciousness: Arousable    Pulmonary Status:   O2 Device: Nasal cannula (11/29/17 1520)   Adequate oxygenation and airway patent    Complications related to anesthesia: None    Post-anesthesia assessment completed.  No concerns    Signed By: Bassam Mullen MD     November 29, 2017

## 2017-11-30 PROCEDURE — 74011250637 HC RX REV CODE- 250/637: Performed by: DENTIST

## 2017-11-30 PROCEDURE — 74011250636 HC RX REV CODE- 250/636: Performed by: DENTIST

## 2017-11-30 PROCEDURE — 0NSV04Z REPOSITION LEFT MANDIBLE WITH INTERNAL FIXATION DEVICE, OPEN APPROACH: ICD-10-PCS | Performed by: DENTIST

## 2017-11-30 PROCEDURE — 90471 IMMUNIZATION ADMIN: CPT

## 2017-11-30 PROCEDURE — 0NST04Z REPOSITION RIGHT MANDIBLE WITH INTERNAL FIXATION DEVICE, OPEN APPROACH: ICD-10-PCS | Performed by: DENTIST

## 2017-11-30 PROCEDURE — 74011000250 HC RX REV CODE- 250: Performed by: DENTIST

## 2017-11-30 PROCEDURE — 0NSR04Z REPOSITION MAXILLA WITH INTERNAL FIXATION DEVICE, OPEN APPROACH: ICD-10-PCS | Performed by: DENTIST

## 2017-11-30 PROCEDURE — 74011000258 HC RX REV CODE- 258: Performed by: DENTIST

## 2017-11-30 PROCEDURE — 90686 IIV4 VACC NO PRSV 0.5 ML IM: CPT | Performed by: DENTIST

## 2017-11-30 PROCEDURE — 65270000029 HC RM PRIVATE

## 2017-11-30 RX ADMIN — DEXAMETHASONE SODIUM PHOSPHATE 10 MG: 10 INJECTION INTRAMUSCULAR; INTRAVENOUS at 06:02

## 2017-11-30 RX ADMIN — SODIUM CHLORIDE 600 MG: 900 INJECTION, SOLUTION INTRAVENOUS at 00:09

## 2017-11-30 RX ADMIN — HYDROCODONE BITARTRATE AND ACETAMINOPHEN 10 MG: 7.5; 325 SOLUTION ORAL at 08:36

## 2017-11-30 RX ADMIN — Medication 2 SPRAY: at 08:24

## 2017-11-30 RX ADMIN — INFLUENZA VIRUS VACCINE 0.5 ML: 15; 15; 15; 15 SUSPENSION INTRAMUSCULAR at 08:31

## 2017-11-30 RX ADMIN — IBUPROFEN 400 MG: 400 TABLET, FILM COATED ORAL at 18:42

## 2017-11-30 RX ADMIN — Medication 2 SPRAY: at 21:00

## 2017-11-30 RX ADMIN — DEXTROSE MONOHYDRATE AND SODIUM CHLORIDE 75 ML/HR: 5; .45 INJECTION, SOLUTION INTRAVENOUS at 04:14

## 2017-11-30 RX ADMIN — HYDROCODONE BITARTRATE AND ACETAMINOPHEN 10 MG: 7.5; 325 SOLUTION ORAL at 12:39

## 2017-11-30 RX ADMIN — HYDROCODONE BITARTRATE AND ACETAMINOPHEN 10 MG: 7.5; 325 SOLUTION ORAL at 20:08

## 2017-11-30 RX ADMIN — HYDROCODONE BITARTRATE AND ACETAMINOPHEN 10 MG: 7.5; 325 SOLUTION ORAL at 00:08

## 2017-11-30 RX ADMIN — HYDROCODONE BITARTRATE AND ACETAMINOPHEN 10 MG: 7.5; 325 SOLUTION ORAL at 16:09

## 2017-11-30 RX ADMIN — HYDROMORPHONE HYDROCHLORIDE 1 MG: 2 INJECTION, SOLUTION INTRAMUSCULAR; INTRAVENOUS; SUBCUTANEOUS at 04:10

## 2017-11-30 RX ADMIN — SODIUM CHLORIDE 600 MG: 900 INJECTION, SOLUTION INTRAVENOUS at 08:24

## 2017-11-30 NOTE — PROGRESS NOTES
Shift assessment complete. Patient alert and oriented x 4. Respirations even, regular, lung sounds clear. Bowel sounds active in all 4 quadrants. Abdomen soft and non-tender. Ice pack to bilateral jaws. IV fluids infusing well and without difficulty. Bed low, locked, and call light within reach.

## 2017-11-30 NOTE — PROGRESS NOTES
Pt reported she walked in the hallway and tolerated it well. Pt also voided freely about 2-3 times, tolerated full liquid diet.

## 2017-11-30 NOTE — PROGRESS NOTES
Assessment done via doc flow sheet. Pt resting in bed, HOB elevated, resp easy & regular, lungs CTA bilaterally. Jaw bra to bilateral jaws with ice in place. Complained of 5/10 jaw pain, Hycet 10 mg po given as ordered. Pt informed me she is taking her own oral prednisone as per Dr. Elda Beasley instructions to her. Bed low & locked, side rails x3 up with call light within reach, pt instructed to call for assistance as needed.

## 2017-11-30 NOTE — PROGRESS NOTES
Patient enquired when MD will see pt again and requests IV removed. Called Dr. Evelyn Velez office and was told MD will come see pt at around 5:30-6:00 pm.   Relayed above information to pt.

## 2017-11-30 NOTE — BRIEF OP NOTE
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]  Maxillary Asymmetry  Mandibular hyperplasia [M26.03]    Procedure(s):  MAXILLARY LEFORTE I OSTEOTOMY  BILATERAL SAGITTAL SPLIT MANDIBULAR OSTEOTOMY  Surgeon(s) and Role:     * Wen Calix MD - Primary     * Haja Seth MD - Assisting         Assistant Staff:       Surgical Staff:  Circ-1: Margarita High RN  Circ-Relief: Shlomo Evans RN  Scrub Tech-1: Kizzy Solano  Scrub Tech-Relief: Wali Victoria  Event Time In   Incision Start 3802   Incision Close 1321     Anesthesia: General   Estimated Blood Loss: 250 mL  Fluids:  2100 mL of crystalloid Urine:  200 mL  Specimens: * No specimens in log *   Findings: None  Complications: None  Implants:   Implant Name Type Inv. Item Serial No.  Lot No. LRB No. Used Action   WIRE FIX K 2 TRCR [id number] 9CM --  - [id number]  WIRE FIX K 2 TRCR 1.9BXJ49. 9CM --  [id number]  1 Implanted   PLATE BNE L LP 8.3ZG MED L TI --  - [id number]  PLATE BNE L LP 6.8HV MED L TI --   [facility] 2LOAD4 [id number]  2 Implanted   PLATE BNE FX LP L-SHAPED RT -- . 6MM - [id number]  PLATE BNE FX LP L-SHAPED RT -- . 6MM  [facility] 2LOAD4 [id number]  2 Implanted   SCR MINI MAXDRIVE 4.8O3NZ TI --  - [id number]  SCR MINI MAXDRIVE 3.7U0AX TI --   [facility] 2LOAD4 [id number]  14 Implanted   SCR EMERG KARYN 2.3X5MM TI --  - [id number]  SCR EMERG KARYN 2.3X5MM TI --   [facility] 2LOAD4 [id number]  2 Implanted   SCR MINI MAXDRIVE 2.4A80ZD TI --  - [id number]  SCR MINI MAXDRIVE 3.5N00XR TI --   [facility] 2LOAD4 [id number]  2 Implanted   SCR MINI MAXDRIVE 7.2S45LW TI --  - [id number]  SCR MINI MAXDRIVE 3.7B67LG TI --   KLS LUCIANO LP 2LOAD4 [id number]  2 Implanted   SCR MINI MAXDRIVE 0.6T9MS TI --  - [id number]  SCR MINI MAXDRIVE 6.9E7BU TI --   KLS LUCIANO LP 2LOAD4 [id number]  2 Implanted   SCR MINI SD MAXDRIVE 5F7OC TI --  - [id number]  SCR MINI SD MAXDRIVE 4Z1MU TI --   KLS LUCIANO LP 2LOAD4 [id number]  4 Implanted   4 HOLE 0.6mm Plate         2LOAD4 [id number] N/A 1 Implanted

## 2017-11-30 NOTE — PROGRESS NOTES
Problem: Interdisciplinary Rounds  Goal: Interdisciplinary Rounds  Interdisciplinary team rounds were held 11/30/2017 with the following team members:Care Management, Nursing, Nutrition and Pharmacy and the patient. Plan of care discussed. See clinical pathway and/or care plan for interventions and desired outcomes.

## 2017-11-30 NOTE — OP NOTES
[facility] Operative Note           DATE OF SURGERY: 05/18/2016     HISTORY OF PRESENT ILLNESS: This is a very sweet 26-year-old WF that I have know for roughly eight years. She was sent to me originally as a referral from   her orthodontist, Dr Cathy Greer, for evaluation and treatment of her skeletal   discrepancies. Secondary to the nature of the procedure and the eventual   hospital stay, it is best to treat her with general anesthesia in   the operating room.     PREPROCEDURE DIAGNOSES   1. Maxillary hyperplasia. 2. Mandibular hypoplasia. 3. Maxillary Asymmetry  4. Mandibular Asymmetry       POSTPROCEDURE DIAGNOSES   1. Maxillary hyperplasia. 2. Mandibular hypoplasia. 3. Maxillary Asymmetry  4. Mandibular Asymmetry       NAME OF PROCEDURE   1. Evelyn Delio I osteotomy with rigid internal fixation, 2.0 mm KLS Isidro   plates and screws. 2. Bilateral sagittal split ramus osteotomy with rigid internal fixation,   2.0 mm KLS Isidro plates and screws.        SURGEON: Zachary Mayes. Sarah Ribeiro DMD      ASSISTING SURGEON: Moiz Clancy. Gilles Garces DMD      ASSISTANTS: Vaishali Jones, MIKAYLA     ANESTHESIA: General naso-endotracheal anesthesia.      ESTIMATED BLOOD LOSS: 250 mL.      FLUIDS: 2100 mL of Lactated Ringers      URINE: 200 mL      DRAINS: No drains placed.      COMPLICATIONS: Fracture of the proximal portion of the distal segment on the left side of the mandible. This was secured in the normal fashion with bi-cortical screws, but a tension band with mono-cortical screws was placed to further secure the segments      FINDINGS: Consistent with diagnosis.      The patient was given clindamycin 600 mg IV and Decadron 10 mg IV   preoperatively. The plan was for extubation and then PACU with transfer   to floor, inpatient stay, and discharge status to be determined later.      DESCRIPTION OF PROCEDURE: The patient was taken to the operating room and   administered a general anesthetic by the anesthesia service.  Once an adequate plane of anesthesia was obtained, the patient was successfully   nasally intubated. The tube was secured by both myself and the anesthesia   service. The patient was prepped and draped in the usual sterile fashion. A 0.5% Marcaine solution with epinephrine 1:200,000 was used across the   maxilla and the mandible throughout the case to adequately achieve local   anesthesia. A total amount of local used can be found on the anesthesia   record. An external reference adam was used by placing a 45 Giuseppe wire in the   bridge of the nose at the junction of the nasal and frontal bones. That   external reference was used throughout the case to help verify the   position of the maxilla in its new position. A series of measurements were made off of this external reference marker   that were used to help us at the completion of the repositioning of the   maxilla. A full thickness mucoperiosteal flap was initiated using Bovie   electrocautery. This circumvestibular incision was used from first   premolar to first premolar. The soft tissue flap was elevated inferiorly   to expose the maxilla, superiorly to the level of the infraorbital nerve,   medially to the piriform rim, and posteriorly to the maxillary buttress   region. The soft tissue flap was then carried posteriorly at the level of   the buttress back to the pterygoid plates. A 1 x 3 neuro soren soaked in   Afrin was then packed in this area to help facilitate hemostasis. That   same neuro soren helped to lift the nasal mucosa off the bony floor of   the nose as we dissected each side and posteriorly. That neuro soren was   then left there to help facilitate hemostasis and we were prepared to   create our osteotomy. A reciprocating saw was used starting at the maxillary buttress region   and moving medially towards the piriform rim. The deflection of that saw   blade was inferiorly as we moved from laterally to medially.  An Obmayrageser   retractor was used to protect the cheek laterally. A Molt 9 periosteal   elevator was used to help protect the nasal mucosa. That saw blade was   then turned around at the maxillary buttress region 180 degrees and was   brought laterally towards that Obwegeser retractor to help ensure that we   had good separation of the maxilla from the basal skeleton laterally. A   small thin spatula osteotome was used at the buttress region and was   carried back posteriorly to the level of the pterygoid plates. We then   used a pterygoid plate chisel to help separate the maxillary tuberosity   from the pterygoid plates. This was repeated on each side and then we   prepared to osteotomize the nose. A single guarded chisel was then used   to separate the maxilla from the cranial base by using a chisel along the   ridge of the junction of the lateral nasal wall with the medial wall of   the maxillary sinus. This was also carried posteriorly to about 30 mm to   ensure that we had good bony separation. A double-guarded chisel was then carried down the medial portion of the   maxilla to help separate the cartilaginous and bony septum from the floor   of the nose. Gentle downward and anterior pressure was used to help   create the downfracture of the maxilla. With rongeur, we removed any   sharp bony edges along the superior portion of the maxilla. Hemoclips   were used x2 on each side to help ligate the descending palatine vessels. Bovie electrocautery was then used to create this separation and to   cauterize any bleeders that we may have found following downfracture. A   Rosa M retractor was used to help mobilize the maxilla anteriorly by   placing it posterior to the maxillary tuberosity and pulling forward   gently. Packs were then placed across the posterior of the maxilla to   ensure that we had good hemostasis.  An egg-shaped bur was then used to   cross the superior aspect of the maxilla to help create a nice smooth   surface since we were planning on impacting the maxilla a few   millimeters. Attention was then turned to the inferior aspect of the nose and the   nasal septum. The nasal septum was freed up from its periosteum and   perichondrium using a Malt 9 periosteal elevator. The nasal septum was   then trimmed and aligned so that it would be in a much smoother and   straighter orientation. A portion of the bony septum was removed using a   rongeur. This incision and any tears that we had in the nasal mucosa were   repaired using a 4-0 plain gut suture on a P3 needle. The prefabricated   intermaxillary splint was placed in between the teeth and the jaw was   wired together using 26 gauge straight wire to create a complex of the   maxilla and the mandible. This complex was then rotated superiorly,   making sure that the condyles were seated properly in the fossa as we   rotated superiorly. Once we got to a good stable position, we went back   to our external reference marker and took a measurement. Once we were   satisfied with the measurement that we had obtained and the new position   of the maxilla in a vertical direction, we prepared for rigid fixation. The maxilla was then taken back down, cleaned, debrided, made sure that   any bleeders were taken care of and the bone was free of any bleeders. The maxillomandibular complex was then rotated superiorly into that same   position with good bony contact and was held there while we plated the   maxilla in its new position. The 2.0 mm KLS Isidro plates were used and bent to lay passively across   the bone. These plates were fixated using 2.0 mm KLS Isidro monocortical   screws. We used 4 on each plate and 4 plates for a total of 16 screws. Once we felt good about the bony plates, we then released her   maxillomandibular fixation wires.  We watched carefully as the mandible   fell out of the splint and then verified our occlusion to ensure this was   what we had determined, we wanted as an intermediary occlusion from our   model surgery.      We then turned our attention to the mandible. As I mentioned before, we   injected with 0.5% Marcaine solution with epinephrine 1:200,000. This   allowed us to achieve adequate long-term anesthesia. We then began with   our incision. A standard sagittal ramus incision was created from   superior to inferior using a Bovie electrocautery. Great care was taken   to ensure that the retractors shielded Stensen duct from our incision. That incision was carried through mucosa down through the submucosal   tissue, down through the muscle, periosteum, and then to bone. The soft   tissue dissection was carried inferiorly to the inferior border,   posteriorly to the posterior border, superiorly to the coronoid process,   and then medially to the level of the retrolingular fossa. As we moved   inferiorly on the medial aspect of the mandible, we made sure that we   isolated the lingula of the mandible and the entrance of the inferior   alveolar nerve to the medial surface of the mandible. We were then   prepared to create our osteotomy. That same reciprocating saw was   oriented parallel to the occlusal plane and about 2 mm superior to the   lingula of the mandible. That saw was taken into the retrolingular fossa   and brought anteriorly parallel with the occlusal plane. Once we reached   the anterior border of the ramus, we turned the saw into a sagittal plane   and brought it anterior to the level of the first and second molar of the   mandible. We then turned it laterally and inferiorly towards the   inferior border of the mandible. The saw blade was then rotated 180   degrees and we created inferior J osteotomy to ensure that the inferior   border of the mandible was adequately . Those same small thin spatula osteotomes were used to help walk the   osteotomy and ensure that it was .  Those osteotomes and Smith   spreaders were used to help walk the ostotomy to help create a separation   of the mandible into a proximal and distal half. The inferior alveolar   nerve was isolated as we  the 2 and great care was taken to   ensure that it remained intact. Following separation, we took a fiber   handle osteotome to ensure that we had good separation. Finger pressure   manipulation was used to make sure that we had good proximal and distal   halves. A J stripper was then run along the inferior border of the distal   segment to ensure that the muscular sling is  from the inferior   border of the mandible. One issue arose on the left side during separation of the mandible. Fracture of the proximal portion of the distal segment on the left side of the mandible was seen moving medially between teeth numbers 18 and 19. The GLENDY nerve bundle was in this segment so great care was taken to hold these segments as still as possible. The segments were secured in the normal fashion with bi-cortical screws, but a 2.0 mm KLS Isidro plate was used as a tension band with mono-cortical screws was used across the distal and proximal portion of the osteotomy to further secure the segments. 7 mm screws were used and two screws were placed on either side of the osteotomy. Once we completed this on each side, we were then ready for our final   splint. In much the same way we handled the intermediate splint before,   we took the prefabricated acrylic splint and wired it between the teeth   using 26 gauge straight wire. Once we had the patient in this final   position, we were then prepared to fixate the proximal segments with the   distal segments.      We allowed the proximal segment to lay   passively onto the lateral surface of the distal segment. We had to   create a passive environment so as not to deflect the condyle.  We used an   Allis clamp to hold the mandible in this position while we create a   passageway for the screws to come into the mandible at 90 degrees. A 15   blade was used to create a small stab incision through the cheek and   allow to pass our trocar through the cheek so that we can introduce the   screws perpendicular to the lateral surface of the proximal segment. Cheek guards and retractors were used to help protect the cheek as we   passed burs and screws through the cheek. A KLS Isidro depth gauge was used to verify the depth of the bone and   length of the screw. Once we got 3 solid screws on each side, we verified   that we had a good stable result by pressure with a Molt 9 periosteal   elevator. Following completion of this, the trocar was then removed from   the cheek and the patient was released from his intermaxillary fixation. We watched the mandible passively fall out of the splint. We verified the   occlusion with the splint and without the splint. A small linda bur was   used to remove a small portion of the enamel from the teeth where we had   inferences. We then reverified the occlusion. Once we were happy with   that, we prepared the patient for closure. All of the incisions were   copiously irrigated using normal saline to adequately cleanse the wound.      In the mandible, we achieved a layered closure using 4-0 Vicryl on a PS2   needle and a 3-0 chromic in a running and interrupted fashion using an SH   needle. In the maxilla, a 4-0 Mersilene was used to line up the nasal   septum. It was also used to create an alar cinch. A V-Y closure was   performed using that same 3-0 chromic on a SH needle. We also had a   layered closure in the maxilla using a 4-0 Vicryl on a PS2 and that same   3-0 chromic on an SH. The mucosal closure was both in an interrupted and   a continuous fashion.      Attention was then turned to the chin button. Two 25 gauge loop wires were used to place the patient back into inter-maxillary fixation during this portion of the procedure.   A small incision was initiated from canine to canine in the lower jaw but far enough out in the lip to allow for adequate closure. This incision was carried through mucosa, sub-mucosal layer, the musculature of the chin, and periosteum before reaching the chin button. The Med-Pore chin implant was trimmed and fashioned to fit Aníbal Cheng. It had been pre-soaked in an antibiotic saline wash until this portion of the case. Once we felt it was in the proper position, two 19 mm KLS Isidro screws from the 2.0 mm set were placed on each side of the midline to help achieve stability. We were satisfied with the position after we allowed the soft tissue to drape back over the implant. She was then rinsed and irrigated thoroughly and prepared for closure. A 3.0 Vicryl on a tapered SH needle was used to re-approximate the periosteum and sub-mucosal layers. The mucosa was closed primarily with a 3.0 Chromic Gut suture on a tapered needle. A foam tape dressing was applied to her chin to help apply pressure and allow for soft tissue to begin re-attachment. The two 25 gauge IMF wires were removed.     Under deep suction, the oropharyngeal throat pack was removed. An   orogastric tube was passed temporarily to suction the gastric contents. A   small orthodontic elastic rubber band was used on each side to help serve   as a guide for the patient to find his new occlusion. A 5-0 Prolene on a   P3 was used to close the transcutaneous incision x2 in an interrupted   fashion. Small Steri-Strips were then placed across that to help   facilitate hemostasis. A jaw bra was placed around the patient with ice   packs to help serve as a pressure dressing.      She was then turned over to the anesthesia service where she was awoken,   extubated, and transported to the PACU in stable condition.            LUPE

## 2017-11-30 NOTE — PROGRESS NOTES
OMFS POD 1 Note    Patient is asleep, easily arousable, full of energy this AM.  No events over night. \"Doing great! \"   sleeping at bedside. Music playing in the room. 102.0 F  97.8 °F (36.6 °C) 67 122/66 85 Right arm At rest 18 97 %     Awake, alert and oriented X 4  Jaw bra falling off her face - re-adjusted - needs ice  Very minimal facial swelling seen but mostly confined to her upper lip  No bruising seen at the time of this exam  CN V2 and V3 anesthesia present as expected  Some dried heme in each nare - drainage more clear and red in color  Swelling is soft and non-tender  Extends neck well - trachea is midline  No bruising or swelling in the supra-sternal area    Intra-orally Incisions are closed and hemostatic    Sutures are C/D/I    Guiding elastics are in place    Occlusion is stable and reproducible    FOM is soft and not elevated    Oropharynx not visualized    CV: RRR  Lungs: CTA and equal bilaterally  ABD: Soft, NT/ND with BS      A/P: Lloyd Zavala is a 42 yoWF that is s/p Lefort and BSSRO POD 1 - that is doing very well    I encouraged her to be OOB to chair today, ambulate in the halls today  Encouraged PO intake as well as PO pain control - she is ready  HLIV this AM  Patient is progressing very well - I thought she would - she has that personality    Possibility of discharge this PM if she continues this progress - we discussed PO intake, the pain control, all of her instructions - she understood all that I discussed with her. Please call with any questions/problems - [phone] today.           LUPE

## 2017-12-01 VITALS
BODY MASS INDEX: 22.02 KG/M2 | DIASTOLIC BLOOD PRESSURE: 90 MMHG | HEART RATE: 66 BPM | HEIGHT: 64 IN | SYSTOLIC BLOOD PRESSURE: 159 MMHG | RESPIRATION RATE: 16 BRPM | OXYGEN SATURATION: 96 % | WEIGHT: 129 LBS | TEMPERATURE: 97.6 F

## 2017-12-01 PROCEDURE — 74011250637 HC RX REV CODE- 250/637: Performed by: DENTIST

## 2017-12-01 RX ORDER — IBUPROFEN 800 MG/1
800 TABLET ORAL
Qty: 20 TAB | Refills: 1 | Status: SHIPPED | OUTPATIENT
Start: 2017-12-01 | End: 2019-03-08

## 2017-12-01 RX ORDER — HYDROCODONE BITARTRATE AND ACETAMINOPHEN 7.5; 325 MG/15ML; MG/15ML
15-30 SOLUTION ORAL
Qty: 400 ML | Refills: 0 | Status: SHIPPED | OUTPATIENT
Start: 2017-12-01 | End: 2019-03-08

## 2017-12-01 RX ORDER — AMOXICILLIN 250 MG/5ML
500 POWDER, FOR SUSPENSION ORAL 3 TIMES DAILY
Qty: 210 ML | Refills: 0 | Status: SHIPPED | OUTPATIENT
Start: 2017-12-01 | End: 2017-12-08

## 2017-12-01 RX ORDER — ONDANSETRON 4 MG/1
4 TABLET, ORALLY DISINTEGRATING ORAL
Qty: 15 TAB | Refills: 1 | Status: SHIPPED | OUTPATIENT
Start: 2017-12-01 | End: 2019-03-08

## 2017-12-01 RX ADMIN — IBUPROFEN 400 MG: 400 TABLET, FILM COATED ORAL at 03:32

## 2017-12-01 NOTE — PROGRESS NOTES
Discharge instructions given to and reviewed with pt and pts . Pt with jaw bra on, pt given extra jaw bra for discharge. Pt also given prescriptions. Pt to get packed and call when ready for discharge.

## 2017-12-01 NOTE — PROGRESS NOTES
Shift assessment complete. Patient alert and oriented x 4. Respirations even,regular, and lung sounds clear. Bowel sounds active in all 4 quadrants, abdomen soft and non-tender. Denies pain at this time. Jaw bra in place with ice. Bed low, locked, and call light with in reach.

## 2017-12-01 NOTE — PROGRESS NOTES
OMFS POD 2 Note    Patient asleep in bed, easily arousable, very energetic right away, \"doing great\" and no issues overnight. She mentioned that she broke a rubber band and replaced it from her collection that I gave to her.    98.7  96.4 °F (35.8 °C) 65 134/77 96 -- At rest 16 95 %     Awake, alert and oriented X 4  Moderate facial swelling throughout the lower jaw and mid face as expected  Swelling is soft and non-tender  CN V2 and V3 anesthesia present  Jaw bra in place with ice  Extends neck well - neck is supple and trachea is midline   Nares are dry and no active bleeding seen    Intra-orally Elastics in place    Incisions are closed and hemostatic    Occlusion is stable and reproducible    FOM is soft and not elevated    CV - RRR  Lungs - CTA and equal bilaterally  Abd - Soft NT/ND with BS      A/P: Jaskaran Linda is a 42 yoWF that is POD 2 from [employer] and BSSRO - doing well and ready for discharge    I reviewed all of the post operative instructions with her - understood  Plan for discharge today, follow up by phone this afternoon, and in office next week.     Please call with any questions/problems - [phone]          DD

## 2018-07-21 ENCOUNTER — HOSPITAL ENCOUNTER (OUTPATIENT)
Dept: MAMMOGRAPHY | Age: 43
Discharge: HOME | End: 2018-07-21
Attending: OBSTETRICS & GYNECOLOGY
Payer: COMMERCIAL

## 2018-07-21 DIAGNOSIS — Z12.31 ENCOUNTER FOR SCREENING MAMMOGRAM FOR MALIGNANT NEOPLASM OF BREAST: ICD-10-CM

## 2018-07-21 PROCEDURE — 77063 BREAST TOMOSYNTHESIS BI: CPT

## 2018-07-26 ENCOUNTER — HOSPITAL ENCOUNTER (OUTPATIENT)
Dept: MAMMOGRAPHY | Age: 43
Discharge: HOME | End: 2018-07-26
Attending: OBSTETRICS & GYNECOLOGY
Payer: COMMERCIAL

## 2018-07-26 DIAGNOSIS — R92.8 ABNORMAL SCREENING MAMMOGRAM: ICD-10-CM

## 2018-07-26 PROCEDURE — 77065 DX MAMMO INCL CAD UNI: CPT

## 2019-03-07 ENCOUNTER — HOSPITAL ENCOUNTER (OUTPATIENT)
Dept: SURGERY | Age: 44
Discharge: HOME | End: 2019-03-07

## 2019-03-08 VITALS — BODY MASS INDEX: 20.83 KG/M2 | HEIGHT: 64 IN | WEIGHT: 122 LBS

## 2019-03-08 RX ORDER — FLUTICASONE PROPIONATE 50 MCG
1 SPRAY, SUSPENSION (ML) NASAL DAILY
COMMUNITY

## 2019-03-08 NOTE — PERIOP NOTES
Patient verified name and . Order for consent NOT found in EHR; patient verifies procedure. Type 2 surgery, phone assessment complete. Orders NOT received. Labs per surgeon: unknown. No orders received in EHR at time of phone assessment. Labs per anesthesia protocol: HGB. Coming to the outpatient lab. EKG: not needed per [facility] protocols. Patient answered medical/surgical history questions at their best of ability. All prior to admission medications documented in [facility]. Patient instructed to take the following medications the day of surgery according to anesthesia guidelines with a small sip of water: flonase, ventolin if needed, qvar if needed. Hold all vitamins 7 days prior to surgery and NSAIDS 5 days prior to surgery. Prescription meds to hold: none. Instructed to bring inhalers dos. Patient instructed on the following:  Arrive at A Entrance, time of arrival to be called the day before by 1700  NPO after midnight including gum, mints, and ice chips  Responsible adult must drive patient to the hospital, stay during surgery, and patient will need supervision 24 hours after anesthesia  Use hibiclens in shower the night before surgery and on the morning of surgery  All piercings must be removed prior to arrival.    Leave all valuables (money and jewelry) at home but bring insurance card and ID on DOS. Do not wear make-up, nail polish, lotions, cologne, perfumes, powders, or oil on skin. Patient teach back successful and patient demonstrates knowledge of instruction.

## 2019-03-13 ENCOUNTER — ANESTHESIA EVENT (OUTPATIENT)
Dept: SURGERY | Age: 44
End: 2019-03-13
Payer: COMMERCIAL

## 2019-03-13 NOTE — H&P
OMFS H&P Note     Elvia Stockton is a very sweet 41year old white female that I have known for many years. We took care of Sammi back on 30 Nov 17. 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 I believe this is due to hardware failure that is quite possibly from a bad tooth. She was originally sent to me by her Orthodontist, Dr Mane Can., back in 2009 for evaluation and treatment of her skeletal discrepancies.   Secondary to the nature of the procedure and the eventual hospital stay, it is best to treat her with GA in the OR.        PMHx: Asthma, Seasonal Allergies  PSHx:  Two jaw osteotomy in 2017, Right arm radius fracture in 2013, Gingival grafting, Childbirth X 2  Meds:  Zoloft, Zyrtec, Albuterol INH, ASA 81 mg, Methylfolate, Vit D  All:       NKDA  SHx:    Wine socially - denies tobacco and PSA  FHx:     HTN        PE:      Awake, alert and oriented white female of equal proportion - in NAD at the time of her examination        HEENT:           Obvious facial asymmetry, Left side of mandible is mobile, malocclusion, opens mouth wide, extends neck well, uvula visualized  CV:                  RRR  Lungs:             CTA and equal bilaterally  Abd:                 Soft, NT/ND with BS  Ext:                  Motor 5/5 throughout  Neuro:             CN VII is intact grossly, V3 parasthesia on the left side        A/P:     Elvia Stockton is a 41year old white female with a new skeletal asymmetry and mandibular Non-union     Plan for GA in the OR for debridment, removal of infected hardware, and placement of new fixation  Possible 23 hour observation  Cleocin 600 mg IV and Decadron 10 mg IV OCTOR  Anesthesia to pre-op        Please call with questions - [phone]              DD

## 2019-03-14 ENCOUNTER — ANESTHESIA (OUTPATIENT)
Dept: SURGERY | Age: 44
End: 2019-03-14
Payer: COMMERCIAL

## 2019-03-21 ENCOUNTER — HOSPITAL ENCOUNTER (OUTPATIENT)
Age: 44
Setting detail: OBSERVATION
Discharge: HOME | End: 2019-03-22
Attending: DENTIST | Admitting: DENTIST
Payer: COMMERCIAL

## 2019-03-21 DIAGNOSIS — S02.652S: Primary | ICD-10-CM

## 2019-03-21 PROBLEM — S02.609A MANDIBLE FRACTURE (HCC): Status: ACTIVE | Noted: 2019-03-21

## 2019-03-21 LAB
HCG UR QL: NEGATIVE
HGB BLD-MCNC: 13 G/DL (ref 11.7–15.4)

## 2019-03-21 PROCEDURE — 77030032490 HC SLV COMPR SCD KNE COVD -B: Performed by: DENTIST

## 2019-03-21 PROCEDURE — 77030002888 HC SUT CHRMC J&J -A: Performed by: DENTIST

## 2019-03-21 PROCEDURE — 77030028843 HC ELECTRD CAUT TIP MEGA -B: Performed by: DENTIST

## 2019-03-21 PROCEDURE — 77030020269 HC MISC IMPL: Performed by: DENTIST

## 2019-03-21 PROCEDURE — 77030006812 HC BLD SAW RECIP STRY -B: Performed by: DENTIST

## 2019-03-21 PROCEDURE — 77030002996 HC SUT SLK J&J -A: Performed by: DENTIST

## 2019-03-21 PROCEDURE — 77030019908 HC STETH ESOPH SIMS -A: Performed by: ANESTHESIOLOGY

## 2019-03-21 PROCEDURE — 74011250636 HC RX REV CODE- 250/636: Performed by: ANESTHESIOLOGY

## 2019-03-21 PROCEDURE — 77030026485 HC BN CANC CRSH FD MUSC -E: Performed by: DENTIST

## 2019-03-21 PROCEDURE — 74011250636 HC RX REV CODE- 250/636

## 2019-03-21 PROCEDURE — 74011000250 HC RX REV CODE- 250

## 2019-03-21 PROCEDURE — 77030020268 HC MISC GENERAL SUPPLY: Performed by: DENTIST

## 2019-03-21 PROCEDURE — 74011000250 HC RX REV CODE- 250: Performed by: DENTIST

## 2019-03-21 PROCEDURE — 99218 HC RM OBSERVATION: CPT

## 2019-03-21 PROCEDURE — 76060000037 HC ANESTHESIA 3 TO 3.5 HR: Performed by: DENTIST

## 2019-03-21 PROCEDURE — 74011250636 HC RX REV CODE- 250/636: Performed by: DENTIST

## 2019-03-21 PROCEDURE — C1713 ANCHOR/SCREW BN/BN,TIS/BN: HCPCS | Performed by: DENTIST

## 2019-03-21 PROCEDURE — 77030031139 HC SUT VCRL2 J&J -A: Performed by: DENTIST

## 2019-03-21 PROCEDURE — 77030018836 HC SOL IRR NACL ICUM -A: Performed by: DENTIST

## 2019-03-21 PROCEDURE — 77030002986 HC SUT PROL J&J -A: Performed by: DENTIST

## 2019-03-21 PROCEDURE — 77030020253 HC SOL INJ D545NS .05 DEX .45 SAL

## 2019-03-21 PROCEDURE — 81025 URINE PREGNANCY TEST: CPT

## 2019-03-21 PROCEDURE — 85018 HEMOGLOBIN: CPT

## 2019-03-21 PROCEDURE — 77030034849: Performed by: DENTIST

## 2019-03-21 PROCEDURE — 76010000173 HC OR TIME 3 TO 3.5 HR INTENSV-TIER 1: Performed by: DENTIST

## 2019-03-21 PROCEDURE — 77030008683 HC TU ET CUF COVD -A: Performed by: ANESTHESIOLOGY

## 2019-03-21 PROCEDURE — 77030020782 HC GWN BAIR PAWS FLX 3M -B: Performed by: ANESTHESIOLOGY

## 2019-03-21 PROCEDURE — 77030010512 HC APPL CLP LIG J&J -C: Performed by: DENTIST

## 2019-03-21 PROCEDURE — 77030039425 HC BLD LARYNG TRULITE DISP TELE -A: Performed by: ANESTHESIOLOGY

## 2019-03-21 PROCEDURE — 77030004413 HC BUR OVL STRY -B: Performed by: DENTIST

## 2019-03-21 PROCEDURE — 77030004385 HC BUR FISS STRY -A: Performed by: DENTIST

## 2019-03-21 PROCEDURE — 74011250637 HC RX REV CODE- 250/637: Performed by: ANESTHESIOLOGY

## 2019-03-21 PROCEDURE — 77030004368 HC BUR DENT STRY -B: Performed by: DENTIST

## 2019-03-21 PROCEDURE — 74011000258 HC RX REV CODE- 258: Performed by: DENTIST

## 2019-03-21 PROCEDURE — 76210000017 HC OR PH I REC 1.5 TO 2 HR: Performed by: DENTIST

## 2019-03-21 PROCEDURE — 77030019927 HC TBNG IRR CYSTO BAXT -A: Performed by: DENTIST

## 2019-03-21 DEVICE — IMPLANTABLE DEVICE: Type: IMPLANTABLE DEVICE | Site: MANDIBLE | Status: FUNCTIONAL

## 2019-03-21 DEVICE — GRAFT BNE SUB 15ML 0.1-4MM CANC CRUSH CHIP MORSELIZED FRZ: Type: IMPLANTABLE DEVICE | Site: MANDIBLE | Status: FUNCTIONAL

## 2019-03-21 RX ORDER — HYDROMORPHONE HYDROCHLORIDE 2 MG/ML
1 INJECTION, SOLUTION INTRAMUSCULAR; INTRAVENOUS; SUBCUTANEOUS
Status: DISCONTINUED | OUTPATIENT
Start: 2019-03-21 | End: 2019-03-22 | Stop reason: HOSPADM

## 2019-03-21 RX ORDER — OXYCODONE HYDROCHLORIDE 5 MG/1
5 TABLET ORAL
Status: DISCONTINUED | OUTPATIENT
Start: 2019-03-21 | End: 2019-03-21 | Stop reason: HOSPADM

## 2019-03-21 RX ORDER — OXYMETAZOLINE HCL 0.05 %
2 SPRAY, NON-AEROSOL (ML) NASAL EVERY 12 HOURS
Status: DISCONTINUED | OUTPATIENT
Start: 2019-03-21 | End: 2019-03-22 | Stop reason: HOSPADM

## 2019-03-21 RX ORDER — OXYMETAZOLINE HCL 0.05 %
2 SPRAY, NON-AEROSOL (ML) NASAL
Status: DISCONTINUED | OUTPATIENT
Start: 2019-03-21 | End: 2019-03-21 | Stop reason: HOSPADM

## 2019-03-21 RX ORDER — ONDANSETRON 2 MG/ML
4 INJECTION INTRAMUSCULAR; INTRAVENOUS
Status: DISCONTINUED | OUTPATIENT
Start: 2019-03-21 | End: 2019-03-22 | Stop reason: HOSPADM

## 2019-03-21 RX ORDER — LIDOCAINE HYDROCHLORIDE 10 MG/ML
0.1 INJECTION INFILTRATION; PERINEURAL AS NEEDED
Status: DISCONTINUED | OUTPATIENT
Start: 2019-03-21 | End: 2019-03-21 | Stop reason: HOSPADM

## 2019-03-21 RX ORDER — IBUPROFEN 400 MG/1
400 TABLET ORAL
Status: DISCONTINUED | OUTPATIENT
Start: 2019-03-21 | End: 2019-03-22 | Stop reason: HOSPADM

## 2019-03-21 RX ORDER — DEXAMETHASONE SODIUM PHOSPHATE 100 MG/10ML
10 INJECTION INTRAMUSCULAR; INTRAVENOUS EVERY 8 HOURS
Status: DISCONTINUED | OUTPATIENT
Start: 2019-03-21 | End: 2019-03-22 | Stop reason: HOSPADM

## 2019-03-21 RX ORDER — OXYCODONE AND ACETAMINOPHEN 10; 325 MG/1; MG/1
1 TABLET ORAL
Status: DISCONTINUED | OUTPATIENT
Start: 2019-03-21 | End: 2019-03-22 | Stop reason: HOSPADM

## 2019-03-21 RX ORDER — LIDOCAINE HYDROCHLORIDE 20 MG/ML
INJECTION, SOLUTION EPIDURAL; INFILTRATION; INTRACAUDAL; PERINEURAL AS NEEDED
Status: DISCONTINUED | OUTPATIENT
Start: 2019-03-21 | End: 2019-03-21 | Stop reason: HOSPADM

## 2019-03-21 RX ORDER — NEOSTIGMINE METHYLSULFATE 1 MG/ML
INJECTION INTRAVENOUS AS NEEDED
Status: DISCONTINUED | OUTPATIENT
Start: 2019-03-21 | End: 2019-03-21 | Stop reason: HOSPADM

## 2019-03-21 RX ORDER — CLINDAMYCIN PHOSPHATE 600 MG/50ML
600 INJECTION INTRAVENOUS EVERY 8 HOURS
Status: DISCONTINUED | OUTPATIENT
Start: 2019-03-21 | End: 2019-03-22 | Stop reason: HOSPADM

## 2019-03-21 RX ORDER — FENTANYL CITRATE 50 UG/ML
INJECTION, SOLUTION INTRAMUSCULAR; INTRAVENOUS AS NEEDED
Status: DISCONTINUED | OUTPATIENT
Start: 2019-03-21 | End: 2019-03-21 | Stop reason: HOSPADM

## 2019-03-21 RX ORDER — DIPHENHYDRAMINE HYDROCHLORIDE 50 MG/ML
12.5 INJECTION, SOLUTION INTRAMUSCULAR; INTRAVENOUS
Status: ACTIVE | OUTPATIENT
Start: 2019-03-21 | End: 2019-03-22

## 2019-03-21 RX ORDER — DEXAMETHASONE SODIUM PHOSPHATE 4 MG/ML
INJECTION, SOLUTION INTRA-ARTICULAR; INTRALESIONAL; INTRAMUSCULAR; INTRAVENOUS; SOFT TISSUE AS NEEDED
Status: DISCONTINUED | OUTPATIENT
Start: 2019-03-21 | End: 2019-03-21 | Stop reason: HOSPADM

## 2019-03-21 RX ORDER — PROPOFOL 10 MG/ML
INJECTION, EMULSION INTRAVENOUS AS NEEDED
Status: DISCONTINUED | OUTPATIENT
Start: 2019-03-21 | End: 2019-03-21 | Stop reason: HOSPADM

## 2019-03-21 RX ORDER — DEXAMETHASONE SODIUM PHOSPHATE 100 MG/10ML
10 INJECTION INTRAMUSCULAR; INTRAVENOUS
Status: ACTIVE | OUTPATIENT
Start: 2019-03-21 | End: 2019-03-22

## 2019-03-21 RX ORDER — GLYCOPYRROLATE 0.2 MG/ML
INJECTION INTRAMUSCULAR; INTRAVENOUS AS NEEDED
Status: DISCONTINUED | OUTPATIENT
Start: 2019-03-21 | End: 2019-03-21 | Stop reason: HOSPADM

## 2019-03-21 RX ORDER — MIDAZOLAM HYDROCHLORIDE 1 MG/ML
2 INJECTION, SOLUTION INTRAMUSCULAR; INTRAVENOUS
Status: DISCONTINUED | OUTPATIENT
Start: 2019-03-21 | End: 2019-03-21 | Stop reason: HOSPADM

## 2019-03-21 RX ORDER — DEXTROSE MONOHYDRATE AND SODIUM CHLORIDE 5; .45 G/100ML; G/100ML
75 INJECTION, SOLUTION INTRAVENOUS CONTINUOUS
Status: DISCONTINUED | OUTPATIENT
Start: 2019-03-21 | End: 2019-03-22 | Stop reason: HOSPADM

## 2019-03-21 RX ORDER — BUPIVACAINE HYDROCHLORIDE AND EPINEPHRINE 5; 5 MG/ML; UG/ML
INJECTION, SOLUTION EPIDURAL; INTRACAUDAL; PERINEURAL AS NEEDED
Status: DISCONTINUED | OUTPATIENT
Start: 2019-03-21 | End: 2019-03-21 | Stop reason: HOSPADM

## 2019-03-21 RX ORDER — CLINDAMYCIN PHOSPHATE 600 MG/50ML
600 INJECTION INTRAVENOUS
Status: COMPLETED | OUTPATIENT
Start: 2019-03-21 | End: 2019-03-21

## 2019-03-21 RX ORDER — SODIUM CHLORIDE, SODIUM LACTATE, POTASSIUM CHLORIDE, CALCIUM CHLORIDE 600; 310; 30; 20 MG/100ML; MG/100ML; MG/100ML; MG/100ML
75 INJECTION, SOLUTION INTRAVENOUS CONTINUOUS
Status: DISCONTINUED | OUTPATIENT
Start: 2019-03-21 | End: 2019-03-21 | Stop reason: HOSPADM

## 2019-03-21 RX ORDER — HYDROMORPHONE HYDROCHLORIDE 2 MG/ML
0.5 INJECTION, SOLUTION INTRAMUSCULAR; INTRAVENOUS; SUBCUTANEOUS
Status: DISCONTINUED | OUTPATIENT
Start: 2019-03-21 | End: 2019-03-21 | Stop reason: HOSPADM

## 2019-03-21 RX ORDER — NALOXONE HYDROCHLORIDE 0.4 MG/ML
0.1 INJECTION, SOLUTION INTRAMUSCULAR; INTRAVENOUS; SUBCUTANEOUS
Status: DISCONTINUED | OUTPATIENT
Start: 2019-03-21 | End: 2019-03-21 | Stop reason: HOSPADM

## 2019-03-21 RX ORDER — DIPHENHYDRAMINE HYDROCHLORIDE 50 MG/ML
12.5 INJECTION, SOLUTION INTRAMUSCULAR; INTRAVENOUS
Status: DISCONTINUED | OUTPATIENT
Start: 2019-03-21 | End: 2019-03-21 | Stop reason: HOSPADM

## 2019-03-21 RX ORDER — FLUMAZENIL 0.1 MG/ML
0.2 INJECTION INTRAVENOUS
Status: DISCONTINUED | OUTPATIENT
Start: 2019-03-21 | End: 2019-03-21 | Stop reason: HOSPADM

## 2019-03-21 RX ORDER — SODIUM CHLORIDE, SODIUM LACTATE, POTASSIUM CHLORIDE, CALCIUM CHLORIDE 600; 310; 30; 20 MG/100ML; MG/100ML; MG/100ML; MG/100ML
100 INJECTION, SOLUTION INTRAVENOUS CONTINUOUS
Status: DISCONTINUED | OUTPATIENT
Start: 2019-03-21 | End: 2019-03-21 | Stop reason: HOSPADM

## 2019-03-21 RX ORDER — ONDANSETRON 2 MG/ML
INJECTION INTRAMUSCULAR; INTRAVENOUS AS NEEDED
Status: DISCONTINUED | OUTPATIENT
Start: 2019-03-21 | End: 2019-03-21 | Stop reason: HOSPADM

## 2019-03-21 RX ORDER — ROCURONIUM BROMIDE 10 MG/ML
INJECTION, SOLUTION INTRAVENOUS AS NEEDED
Status: DISCONTINUED | OUTPATIENT
Start: 2019-03-21 | End: 2019-03-21 | Stop reason: HOSPADM

## 2019-03-21 RX ADMIN — FENTANYL CITRATE 25 MCG: 50 INJECTION, SOLUTION INTRAMUSCULAR; INTRAVENOUS at 12:16

## 2019-03-21 RX ADMIN — HYDROMORPHONE HYDROCHLORIDE 0.5 MG: 2 INJECTION, SOLUTION INTRAMUSCULAR; INTRAVENOUS; SUBCUTANEOUS at 13:18

## 2019-03-21 RX ADMIN — NEOSTIGMINE METHYLSULFATE 1 MG: 1 INJECTION INTRAVENOUS at 12:12

## 2019-03-21 RX ADMIN — PROPOFOL 200 MG: 10 INJECTION, EMULSION INTRAVENOUS at 09:39

## 2019-03-21 RX ADMIN — CLINDAMYCIN PHOSPHATE 600 MG: 600 INJECTION, SOLUTION INTRAVENOUS at 17:14

## 2019-03-21 RX ADMIN — DEXAMETHASONE SODIUM PHOSPHATE 10 MG: 4 INJECTION, SOLUTION INTRA-ARTICULAR; INTRALESIONAL; INTRAMUSCULAR; INTRAVENOUS; SOFT TISSUE at 09:47

## 2019-03-21 RX ADMIN — HYDROMORPHONE HYDROCHLORIDE 0.5 MG: 2 INJECTION, SOLUTION INTRAMUSCULAR; INTRAVENOUS; SUBCUTANEOUS at 13:13

## 2019-03-21 RX ADMIN — FENTANYL CITRATE 100 MCG: 50 INJECTION, SOLUTION INTRAMUSCULAR; INTRAVENOUS at 09:39

## 2019-03-21 RX ADMIN — GLYCOPYRROLATE 0.2 MG: 0.2 INJECTION INTRAMUSCULAR; INTRAVENOUS at 12:01

## 2019-03-21 RX ADMIN — DEXAMETHASONE SODIUM PHOSPHATE 10 MG: 10 INJECTION INTRAMUSCULAR; INTRAVENOUS at 17:10

## 2019-03-21 RX ADMIN — ROCURONIUM BROMIDE 50 MG: 10 INJECTION, SOLUTION INTRAVENOUS at 09:39

## 2019-03-21 RX ADMIN — SODIUM CHLORIDE, SODIUM LACTATE, POTASSIUM CHLORIDE, AND CALCIUM CHLORIDE 100 ML/HR: 600; 310; 30; 20 INJECTION, SOLUTION INTRAVENOUS at 08:34

## 2019-03-21 RX ADMIN — LIDOCAINE HYDROCHLORIDE 60 MG: 20 INJECTION, SOLUTION EPIDURAL; INFILTRATION; INTRACAUDAL; PERINEURAL at 09:39

## 2019-03-21 RX ADMIN — MIDAZOLAM 2 MG: 1 INJECTION INTRAMUSCULAR; INTRAVENOUS at 09:15

## 2019-03-21 RX ADMIN — FENTANYL CITRATE 25 MCG: 50 INJECTION, SOLUTION INTRAMUSCULAR; INTRAVENOUS at 12:38

## 2019-03-21 RX ADMIN — HYDROMORPHONE HYDROCHLORIDE 0.5 MG: 2 INJECTION, SOLUTION INTRAMUSCULAR; INTRAVENOUS; SUBCUTANEOUS at 13:08

## 2019-03-21 RX ADMIN — LIDOCAINE HYDROCHLORIDE 0.1 ML: 10 INJECTION, SOLUTION INFILTRATION; PERINEURAL at 08:34

## 2019-03-21 RX ADMIN — FENTANYL CITRATE 25 MCG: 50 INJECTION, SOLUTION INTRAMUSCULAR; INTRAVENOUS at 12:40

## 2019-03-21 RX ADMIN — FENTANYL CITRATE 100 MCG: 50 INJECTION, SOLUTION INTRAMUSCULAR; INTRAVENOUS at 10:31

## 2019-03-21 RX ADMIN — GLYCOPYRROLATE 0.2 MG: 0.2 INJECTION INTRAMUSCULAR; INTRAVENOUS at 12:12

## 2019-03-21 RX ADMIN — FENTANYL CITRATE 50 MCG: 50 INJECTION, SOLUTION INTRAMUSCULAR; INTRAVENOUS at 09:37

## 2019-03-21 RX ADMIN — NEOSTIGMINE METHYLSULFATE 1 MG: 1 INJECTION INTRAVENOUS at 12:01

## 2019-03-21 RX ADMIN — HYDROMORPHONE HYDROCHLORIDE 1 MG: 2 INJECTION, SOLUTION INTRAMUSCULAR; INTRAVENOUS; SUBCUTANEOUS at 21:49

## 2019-03-21 RX ADMIN — HYDROMORPHONE HYDROCHLORIDE 1 MG: 2 INJECTION, SOLUTION INTRAMUSCULAR; INTRAVENOUS; SUBCUTANEOUS at 17:09

## 2019-03-21 RX ADMIN — CLINDAMYCIN PHOSPHATE 600 MG: 600 INJECTION, SOLUTION INTRAVENOUS at 09:45

## 2019-03-21 RX ADMIN — OXYMETAZOLINE HYDROCHLORIDE 2 SPRAY: 0.05 SPRAY NASAL at 08:30

## 2019-03-21 RX ADMIN — FENTANYL CITRATE 25 MCG: 50 INJECTION, SOLUTION INTRAMUSCULAR; INTRAVENOUS at 11:56

## 2019-03-21 RX ADMIN — FENTANYL CITRATE 50 MCG: 50 INJECTION, SOLUTION INTRAMUSCULAR; INTRAVENOUS at 09:51

## 2019-03-21 RX ADMIN — DEXTROSE MONOHYDRATE AND SODIUM CHLORIDE 75 ML/HR: 5; .45 INJECTION, SOLUTION INTRAVENOUS at 15:31

## 2019-03-21 RX ADMIN — ONDANSETRON 4 MG: 2 INJECTION INTRAMUSCULAR; INTRAVENOUS at 10:33

## 2019-03-21 NOTE — PROGRESS NOTES
03/21/19 1505 Dual Skin Pressure Injury Assessment Dual Skin Pressure Injury Assessment WDL Second Care Provider (Based on [address] [place] [address]) SHANTANU MAGANA Skin Integumentary Skin Integumentary (WDL) X Pressure  Injury Documentation No Pressure Injury Noted-Pressure Ulcer Prevention Initiated Skin Condition/Temp Dry; Warm  
Skin Color Appropriate for ethnicity Skin Integrity Incision (comment) 
(jaw)

## 2019-03-21 NOTE — BRIEF OP NOTE
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 Mollie Gustavo Infection and inflammatory reaction due to device, implant, and graft, initial encounter ([facility] [address] Ariel Bernal Infected hardware Postoperative Diagnosis: FERNANDO Procedure(s): 
Debridement of fracture Hardware removal 
ORIF left mandibular Non-union Bone graft to mandible Surgeon(s) and Role: Roe Ellis MD - Primary Surgical Assistant: Tesha Newman Surgical Staff: 
Circ-1: Abigail Dimas RN 
Circ-Relief: Luda Cunningham Scrub Tech-1: Zuleika Vargas Scrub Tech-2: Fleet Lava Event Time In Time Out Incision Start 2170 Incision Close 1219 Anesthesia: General  
Estimated Blood Loss: 100 mL Specimens: * No specimens in log * Findings: Consistent with diagnosis Complications: None Implants:  
Implant Name Type Inv. Item Serial No.  Lot No. LRB No. Used Action BONE CRUSH CANC CHIPS 15ML FD -- 0.1-4MM - [id number]  BONE CRUSH CANC CHIPS 15ML FD -- 0.1-4MM [id number] MUSCULOSKELETAL TRANS 5610 Left 1 Implanted

## 2019-03-21 NOTE — ANESTHESIA PREPROCEDURE EVALUATION
Anesthetic History No history of anesthetic complications Review of Systems / Medical History Patient summary reviewed and pertinent labs reviewed Pulmonary Asthma (Rare symptoms. Associated with environmental allergens. ) : well controlled Neuro/Psych Within defined limits Cardiovascular Exercise tolerance: >4 METS 
  
GI/Hepatic/Renal 
  
GERD (Rare symptoms): well controlled Endo/Other Within defined limits Other Findings Physical Exam 
 
Airway Mallampati: I 
TM Distance: 4 - 6 cm Neck ROM: normal range of motion Mouth opening: Normal 
 
 Cardiovascular Rhythm: regular Rate: normal 
 
 
 
 Dental 
 
 
  
Pulmonary Breath sounds clear to auscultation Abdominal 
 
 
 
 Other Findings Anesthetic Plan ASA: 2 Anesthesia type: general 
 
 
 
 
Induction: Intravenous Anesthetic plan and risks discussed with: Patient and Spouse

## 2019-03-21 NOTE — PROGRESS NOTES
TRANSFER - IN REPORT: 
 
Verbal report received from SHANTANU Paniagua(name) on Sammi Reyes  being received from [place]) for routine progression of care Report consisted of patients Situation, Background, Assessment and  
Recommendations(SBAR). Information from the following report(s) Kardex was reviewed with the receiving nurse. Opportunity for questions and clarification was provided. Gave  written report to Teresa Briseno RN.

## 2019-03-21 NOTE — PERIOP NOTES
TRANSFER - OUT REPORT: 
 
Verbal report given to Tavo Pitts RN on Reford Tod Reyes  being transferred to [phone] for routine post - op Report consisted of patients Situation, Background, Assessment and  
Recommendations(SBAR). Information from the following report(s) OR Summary, Procedure Summary, Intake/Output and MAR was reviewed with the receiving nurse. Opportunity for questions and clarification was provided. Patient transported with: 
 O2 @ 2 liters

## 2019-03-21 NOTE — ANESTHESIA POSTPROCEDURE EVALUATION
Procedure(s): INTERNAL FIXATION OF THE LEFT MANDIABLE  
HARDWARE REMOVAL LEFT MANDIBLE KLS LUCIANO. general 
 
Anesthesia Post Evaluation Multimodal analgesia: multimodal analgesia used between 6 hours prior to anesthesia start to PACU discharge Patient location during evaluation: PACU Patient participation: complete - patient participated Level of consciousness: awake Pain management: adequate Airway patency: patent Anesthetic complications: no 
Cardiovascular status: acceptable Respiratory status: spontaneous ventilation and acceptable Hydration status: acceptable Post anesthesia nausea and vomiting:  none Vitals Value Taken Time /87 3/21/2019 12:53 PM  
Temp 36.8 °C (98.2 °F) 3/21/2019 12:38 PM  
Pulse 75 3/21/2019 12:53 PM  
Resp 15 3/21/2019 12:53 PM  
SpO2 96 % 3/21/2019 12:53 PM

## 2019-03-22 VITALS
OXYGEN SATURATION: 93 % | TEMPERATURE: 98.4 F | BODY MASS INDEX: 21.46 KG/M2 | WEIGHT: 125 LBS | SYSTOLIC BLOOD PRESSURE: 110 MMHG | RESPIRATION RATE: 18 BRPM | DIASTOLIC BLOOD PRESSURE: 71 MMHG | HEART RATE: 82 BPM

## 2019-03-22 PROCEDURE — 99218 HC RM OBSERVATION: CPT

## 2019-03-22 PROCEDURE — 74011000258 HC RX REV CODE- 258: Performed by: DENTIST

## 2019-03-22 PROCEDURE — 74011250636 HC RX REV CODE- 250/636: Performed by: DENTIST

## 2019-03-22 PROCEDURE — 74011250637 HC RX REV CODE- 250/637: Performed by: DENTIST

## 2019-03-22 PROCEDURE — 77030020253 HC SOL INJ D545NS .05 DEX .45 SAL

## 2019-03-22 RX ORDER — ONDANSETRON 4 MG/1
4 TABLET, ORALLY DISINTEGRATING ORAL
Qty: 10 TAB | Refills: 1 | Status: SHIPPED | OUTPATIENT
Start: 2019-03-22

## 2019-03-22 RX ORDER — CLINDAMYCIN HYDROCHLORIDE 150 MG/1
300 CAPSULE ORAL 4 TIMES DAILY
Qty: 40 CAP | Refills: 0 | Status: SHIPPED | OUTPATIENT
Start: 2019-03-22

## 2019-03-22 RX ORDER — OXYCODONE AND ACETAMINOPHEN 10; 325 MG/1; MG/1
1 TABLET ORAL
Qty: 30 TAB | Refills: 0 | Status: SHIPPED | OUTPATIENT
Start: 2019-03-22 | End: 2019-03-25

## 2019-03-22 RX ORDER — IBUPROFEN 800 MG/1
800 TABLET ORAL
Qty: 20 TAB | Refills: 1 | Status: SHIPPED | OUTPATIENT
Start: 2019-03-22

## 2019-03-22 RX ADMIN — OXYCODONE AND ACETAMINOPHEN 1 TABLET: 10; 325 TABLET ORAL at 08:11

## 2019-03-22 RX ADMIN — DEXAMETHASONE SODIUM PHOSPHATE 10 MG: 10 INJECTION INTRAMUSCULAR; INTRAVENOUS at 01:50

## 2019-03-22 RX ADMIN — OXYMETAZOLINE HYDROCHLORIDE 2 SPRAY: 0.05 SPRAY NASAL at 08:13

## 2019-03-22 RX ADMIN — DEXTROSE MONOHYDRATE AND SODIUM CHLORIDE 75 ML/HR: 5; .45 INJECTION, SOLUTION INTRAVENOUS at 04:12

## 2019-03-22 RX ADMIN — CLINDAMYCIN PHOSPHATE 600 MG: 600 INJECTION, SOLUTION INTRAVENOUS at 01:50

## 2019-03-22 NOTE — PROGRESS NOTES
OMFS POD 1 Note Patient is awake, lying in bed with her music on, head is elevated, complaining of pain and soreness on the left side. No other issues last night. 
 
98.2   
 97.8 °F (36.6 °C) 68 117/64 82  At rest;Supine 18 92 % Awake, alert and oriented X4 Moderate left sided facial swelling present as expect Jaw no in place, but she has an ice pack that she has been laying on 
CN V3/Mental anesthesia present on the left as expected SS are intact with no heme beneath them Neck is supple - extends her neck well - trachea midline No bruising or ecchymosis present Intra-orally Occlusion is stable and reproducible Guiding elastics in place Incision is closed and hemostatic FOM is soft and not elevated Oropharynx visualized and clear A/P: Reg Boyd is a very sweet 41 yoWF that is POD 1 from an ORIF of a left sided mandibular Non-Union. She is doing very well I reviewed all of the post operative instructions with her. She is aware from the last surgery and understood all that I discussed with her. We will strictly enforce the soft, non-chewing diet for six weeks Continue ABX and pain control at home Plan for discharge this AM - phone follow up on the weekend with in office follow up next week. Please call with any questions or problems - [phone] VV

## 2019-03-22 NOTE — OP NOTES
[facility] Operative Note           DATE OF SURGERY: 03/21/2019     HISTORY OF PRESENT ILLNESS: Diana Rick is a very sweet 53-year-old WF that I have know for many, many years. We corrected her skeletal discrepancies roughly a year and a half ago. She was sent to me originally as a referral from   her orthodontist, Dr John Reyes, for evaluation and treatment of these skeletal   discrepancies. Unfortunately, she has developed a non-union of the left mandible that is going to require correction. Secondary to the nature of the procedure and the eventual   hospital stay, it is best to treat her with general anesthesia in   the operating room.     PREPROCEDURE DIAGNOSES   1. Mandibular Non-Union    2. Mandibular hypoplasia  3. S/P Two Jaw Orthognathic surgery  4.  Mandibular Asymmetry  5. Infected hardware        POSTPROCEDURE DIAGNOSES   1. Mandibular Non-Union    2. Mandibular hypoplasia  3. S/P Two Jaw Orthognathic surgery  4.  Mandibular Asymmetry  5. Infected Hardware        NAME OF PROCEDURE   1. Removal of Infected Hardware  2. Debridement of Mandibular Non-Union  3. ORIF Left Mandibular Non-Union  4. Allogenic bone graft to left mandible      SURGEON: Rodríguez Gutierrez. Kelly Ramsey DMD         ASSISTANTS: Judie Jones CST     ANESTHESIA: General naso-endotracheal anesthesia.      ESTIMATED BLOOD LOSS: 100 mL.      FLUIDS: See Anesthesia record     URINE:  Not measured     DRAINS: No drains placed.        COMPLICATIONS: None     FINDINGS: Consistent with diagnosis.      The patient was given clindamycin 600 mg IV and Decadron 10 mg IV   preoperatively. The plan was for extubation and then PACU with transfer   to floor, inpatient stay, and discharge status to be determined later.      DESCRIPTION OF PROCEDURE: The patient was taken to the operating room and   administered a general anesthetic by the anesthesia service. Once an   adequate plane of anesthesia was obtained, the patient was successfully   nasally intubated. The tube was secured by both myself and the anesthesia   service. The patient was prepped and draped in the usual sterile fashion. A 0.5% Marcaine solution with epinephrine 1:200,000 was used across the left mandible to adequately achieve local   anesthesia. A total amount of local used can be found on the anesthesia   record. We began with   our incision. A standard sagittal ramus incision was created from   superior to inferior using a Bovie electrocautery. Great care was taken   to ensure that the retractors shielded Stensen duct from our incision. I used the previous incision as a guide. The incision was carried through mucosa down through the submucosal   tissue, down through the muscle, periosteum, and then to bone. The soft   tissue dissection was carried inferiorly to the inferior border,   posteriorly to the posterior border, superiorly to the coronoid process,   and then medially to the level of the retrolingular fossa. As we moved   inferiorly on the medial aspect of the mandible, we made sure that we   isolated the lingula of the mandible and the entrance of the inferior   alveolar nerve to the medial surface of the mandible. The plate on the lateral surface of the mandible was found floating in the soft tissue. I took a lot of time trying to isolate the granulation tissue around the osteotomy as well as the plate. The fixation screws were in good solid bone. Two of the screws had bone growing over the head of the screw. I could not tell where the osteotomies were in that portion. I found a funnel like (or cone like) area of granulation tissue around the area of #18's extraction site. I mentioned this earlier when referencing the plate. I cleaned this area with a Rongeur, curette, and bone file. I was able to mobilize the segments and free up the proximal segment to my satisfaction.      I molded a KLS Isidro 2.3 mm plate to the lateral surface of the mandible.   I placed two bi-cortical screws in the distal segment to hold the plate in position. I used a trochar incision to place the screws in the proximal portion. A 15   blade was used to create a small stab incision through the cheek and   allow to pass our trocar through the cheek so that we can introduce the   screws perpendicular to the lateral surface of the proximal segment. Cheek guards and retractors were used to help protect the cheek as we   passed burs and screws through the cheek. A KLS Isidro depth gauge was used to verify the depth of the bone and   length of the screw. Once we got 3 solid screws on each side of the plate, we verified   that we had a good stable result by pressure with a Molt 9 periosteal   elevator. Following completion of this, the trochar was then removed from   the cheek and the patient was released from his intermaxillary fixation. We watched the mandible passively fall out of the splint. We verified the   occlusion with the splint and without the splint. A small linda bur was   used to remove a small portion of the enamel from the teeth where we had   inferences. We then reverified the occlusion. I then used some allogeneic bone from the OR - 15 cc of cortico-cancellous chips to pack the defect. It, obviously, mixed with her blood, and I achieved a good area of graft material in and around the plate. Once we were happy with   that, we prepared the patient for closure. The incisions were   copiously irrigated using normal saline to adequately cleanse the wound. I achieved a layered closure using 4-0 Vicryl on a PS2   needle and a 3-0 chromic in a running and interrupted fashion using an [facility]   Needle. The mucosal closure was both in an interrupted and   a continuous fashion.      Under deep suction, the oropharyngeal throat pack was removed. An   orogastric tube was passed temporarily to suction the gastric contents.  A   small orthodontic elastic rubber band was used on each side to help serve   as a guide for the patient to find his new occlusion. A 5-0 Prolene on a   P3 was used to close the transcutaneous incision x2 in an interrupted   fashion. Small Steri-Strips were then placed across that to help   facilitate hemostasis.  A jaw bra was placed around the patient with ice   packs to help serve as a pressure dressing.      She was then turned over to the anesthesia service where she was awoken,   extubated, and transported to the PACU in stable condition.               LUPE

## 2019-03-22 NOTE — DISCHARGE INSTRUCTIONS
We will strictly enforce the soft, non-chewing diet for six weeks  Continue ABX and pain control at home  Plan for discharge this AM - phone follow up on the weekend with in office follow up next week.     Please call with any questions or problems - [phone]      DISCHARGE SUMMARY from Nurse    PATIENT INSTRUCTIONS:    After general anesthesia or intravenous sedation, for 24 hours or while taking prescription Narcotics:  · Limit your activities  · Do not drive and operate hazardous machinery  · Do not make important personal or business decisions  · Do  not drink alcoholic beverages  · If you have not urinated within 8 hours after discharge, please contact your surgeon on call. Report the following to your surgeon:  · Excessive pain, swelling, redness or odor of or around the surgical area  · Temperature over 100.5  · Nausea and vomiting lasting longer than 4 hours or if unable to take medications  · Any signs of decreased circulation or nerve impairment to extremity: change in color, persistent  numbness, tingling, coldness or increase pain  · Any questions    What to do at Home:  Recommended activity: Activity as tolerated, see above instructions    If you experience any of the following symptoms uncontrolled pain/nausea/vomiting, fever or other s/s of infection, uncontrolled nausea/vomiting, please follow up with Dr. Jose Eduardo Patel. *  Please give a list of your current medications to your Primary Care Provider. *  Please update this list whenever your medications are discontinued, doses are      changed, or new medications (including over-the-counter products) are added. *  Please carry medication information at all times in case of emergency situations. These are general instructions for a healthy lifestyle:    No smoking/ No tobacco products/ Avoid exposure to second hand smoke  Surgeon General's Warning:  Quitting smoking now greatly reduces serious risk to your health.     Obesity, smoking, and sedentary lifestyle greatly increases your risk for illness    A healthy diet, regular physical exercise & weight monitoring are important for maintaining a healthy lifestyle    You may be retaining fluid if you have a history of heart failure or if you experience any of the following symptoms:  Weight gain of 3 pounds or more overnight or 5 pounds in a week, increased swelling in our hands or feet or shortness of breath while lying flat in bed. Please call your doctor as soon as you notice any of these symptoms; do not wait until your next office visit. Recognize signs and symptoms of STROKE:    F-face looks uneven    A-arms unable to move or move unevenly    S-speech slurred or non-existent    T-time-call 911 as soon as signs and symptoms begin-DO NOT go       Back to bed or wait to see if you get better-TIME IS BRAIN. Warning Signs of HEART ATTACK     Call 911 if you have these symptoms:   Chest discomfort. Most heart attacks involve discomfort in the center of the chest that lasts more than a few minutes, or that goes away and comes back. It can feel like uncomfortable pressure, squeezing, fullness, or pain.  Discomfort in other areas of the upper body. Symptoms can include pain or discomfort in one or both arms, the back, neck, jaw, or stomach.  Shortness of breath with or without chest discomfort.  Other signs may include breaking out in a cold sweat, nausea, or lightheadedness. Don't wait more than five minutes to call 911 - MINUTES MATTER! Fast action can save your life. Calling 911 is almost always the fastest way to get lifesaving treatment. Emergency Medical Services staff can begin treatment when they arrive -- up to an hour sooner than if someone gets to the hospital by car. The discharge information has been reviewed with the patient. The patient verbalized understanding.   Discharge medications reviewed with the patient and appropriate educational materials and side effects teaching were provided. ___________________________________________________________________________________________________________________________________    Facial Trauma Repair: What to Expect at Home  Your Recovery  Facial trauma repair is surgery to fix an injury to the face or jaw. The surgery may have been done to stop bleeding, repair damaged tissue, or fix broken bones. Your face may be swollen and bruised. It may take 5 to 7 days for the swelling to go down, and 10 to 14 days for the bruising to fade. It may be hard to eat at first.  If you have stitches, the doctor may need to remove them about a week after surgery. It will probably take a few months for you to heal after surgery. Your face may look different than it did before your injury. Sometimes more surgery is needed later to help make your face look as close to how it did before the injury as possible. When you can return to work depends on the injury you had and what type of work you do. You may be able to go back to work in 1 to 2 weeks. This care sheet gives you a general idea about how long it will take for you to recover. But each person recovers at a different pace. Follow the steps below to get better as quickly as possible. How can you care for yourself at home? Activity    · Rest when you feel tired. Getting enough sleep will help you recover. Sleep with your head up by using two or three pillows. You can also try to sleep with your head up in a reclining chair.     · Avoid any activity that might re-injure your face or jaw, until your doctor says it is okay.     · Follow your doctor's instructions for cleaning your teeth and mouth.     · Ask your doctor when you can drive again.     · You will probably need to take at least 1 to 2 weeks off from work. But you may need to take longer off work, depending on your injury and the type of work you do. Diet    · Follow your doctor's advice about what you can eat.  You may need to eat a soft diet, or you may have to drink your meals through a straw.     · Drink plenty of fluids to avoid becoming dehydrated. Medicines    · Your doctor will tell you if and when you can restart your medicines. He or she will also give you instructions about taking any new medicines.     · If you take blood thinners, such as warfarin (Coumadin), clopidogrel (Plavix), or aspirin, be sure to talk to your doctor. He or she will tell you if and when to start taking those medicines again. Make sure that you understand exactly what your doctor wants you to do.     · Be safe with medicines. Take pain medicines exactly as directed. ? If the doctor gave you a prescription medicine for pain, take it as prescribed. ? If you are not taking a prescription pain medicine, ask your doctor if you can take an over-the-counter medicine.     · If you think your pain medicine is making you sick to your stomach:  ? Take your pain medicine after meals (unless your doctor has told you not to). ? Ask your doctor for a different pain medicine.     · If your doctor prescribed antibiotics, take them as directed. Do not stop taking them just because you feel better. You need to take the full course of antibiotics. Incision care    · If you have an incision, or cuts or scrapes on your face, wash the area daily with warm, soapy water, and pat it dry.     · Your doctor may give you other instructions about how to care for your incision. Follow your doctor's instructions exactly.    Ice    · Put ice or a cold pack on your face or jaw for 10 to 20 minutes at a time. Try to do this 3 or more times a day for 10 to 20 minutes at a time. Put a thin cloth between the ice and your skin. Other instructions    · If your jaw is wired shut, keep wire cutters with you at all times in case you throw up. Your doctor will show you how to use them. Follow-up care is a key part of your treatment and safety.  Be sure to make and go to all appointments, and call your doctor if you are having problems. It's also a good idea to know your test results and keep a list of the medicines you take. When should you call for help? Call 911 anytime you think you may need emergency care. For example, call if:    · You passed out (lost consciousness).     · You have severe trouble breathing.     · You have sudden chest pain and shortness of breath, or you cough up blood.    Call your doctor now or seek immediate medical care if:    · You have pain that does not get better after you take pain medicine.     · You have loose stitches, or your incision comes open.     · You are bleeding from the incision.     · You have signs of infection, such as:  ? Increased pain, swelling, warmth, or redness. ? Red streaks leading from the incision. ? Pus draining from the incision. ? A fever.     · You have signs of a blood clot in your leg, such as:  ? Pain in your calf, back of the knee, thigh, or groin. ? Redness and swelling in your leg or groin.     · You have trouble talking or swallowing.     · Your mouth is bleeding.    Watch closely for any changes in your health, and be sure to contact your doctor if:    · You do not get better as expected. Where can you learn more? Go to http://roberto carlos-robbi.info/. Enter S080 in the search box to learn more about \"Facial Trauma Repair: What to Expect at Home. \"  Current as of: April 17, 2018  Content Version: 11.9  © 2537-7417 [employer]. Care instructions adapted under license by [employer] (which disclaims liability or warranty for this information). If you have questions about a medical condition or this instruction, always ask your healthcare professional. [address] any warranty or liability for your use of this information.

## 2019-03-22 NOTE — PROGRESS NOTES
Discharge instructions were reviewed with the patient. Opportunity for questions given. Patient verbalized understanding of discharge and follow up instructions, as well as S/S to report to MD or return to ER for. PIV was removed. Prescriptions provided. Patient will D/C to home. Has f/u appts scheduled.

## 2019-03-22 NOTE — PROGRESS NOTES
Shift assessment done. Alert and oriented x4. Respirations even and unlabored. HR regular. Abdomen soft with active bowel sounds. Facial and lip swelling noted. Jaw bra in placed with ice pack. Denies needs and pain this time. Bed low and locked. Call light within reach. Will continue to monitor.

## 2021-03-06 ENCOUNTER — HOSPITAL ENCOUNTER (OUTPATIENT)
Dept: MAMMOGRAPHY | Age: 46
Discharge: HOME | End: 2021-03-06
Attending: NURSE PRACTITIONER
Payer: COMMERCIAL

## 2021-03-06 DIAGNOSIS — Z12.31 ENCOUNTER FOR SCREENING MAMMOGRAM FOR MALIGNANT NEOPLASM OF BREAST: ICD-10-CM

## 2021-03-06 PROCEDURE — 77067 SCR MAMMO BI INCL CAD: CPT

## 2022-03-19 PROBLEM — M26.11 MAXILLARY ASYMMETRY: Status: ACTIVE | Noted: 2017-11-29

## 2022-03-19 PROBLEM — S02.609A MANDIBLE FRACTURE (HCC): Status: ACTIVE | Noted: 2019-03-21

## 2022-05-19 ENCOUNTER — TRANSCRIBE ORDER (OUTPATIENT)
Dept: SCHEDULING | Age: 47
End: 2022-05-19

## 2022-05-19 DIAGNOSIS — Z12.31 ENCOUNTER FOR SCREENING MAMMOGRAM FOR MALIGNANT NEOPLASM OF BREAST: Primary | ICD-10-CM

## 2022-07-11 NOTE — DISCHARGE INSTRUCTIONS
DISCHARGE SUMMARY from Nurse    The following personal items are in your possession at time of discharge:    Dental Appliances: Other (comment) (braces)  Visual Aid: Glasses     Home Medications: None  Jewelry: None  Clothing: Shirt, Pants, Footwear, Undergarments  Other Valuables: Eyeglasses             PATIENT INSTRUCTIONS:    After general anesthesia or intravenous sedation, for 24 hours or while taking prescription Narcotics:  · Limit your activities  · Do not drive and operate hazardous machinery  · Do not make important personal or business decisions  · Do  not drink alcoholic beverages  · If you have not urinated within 8 hours after discharge, please contact your surgeon on call. Report the following to your surgeon:  · Excessive pain, swelling, redness or odor of or around the surgical area  · Temperature over 100.5  · Nausea and vomiting lasting longer than 4 hours or if unable to take medications  · Any signs of decreased circulation or nerve impairment to extremity: change in color, persistent  numbness, tingling, coldness or increase pain  · Any questions        What to do at Home:  Recommended activity: Activity as tolerated, per MD    If you experience any of the following symptoms fever>101, pain unrelieved with medication, nausea/vomiting, shortness of breath, dizziness/fainting, chest pain. , please follow up with your doctor. *  Please give a list of your current medications to your Primary Care Provider. *  Please update this list whenever your medications are discontinued, doses are      changed, or new medications (including over-the-counter products) are added. *  Please carry medication information at all times in case of emergency situations.           These are general instructions for a healthy lifestyle:    No smoking/ No tobacco products/ Avoid exposure to second hand smoke    Surgeon General's Warning:  Quitting smoking now greatly reduces serious risk to your health. Obesity, smoking, and sedentary lifestyle greatly increases your risk for illness    A healthy diet, regular physical exercise & weight monitoring are important for maintaining a healthy lifestyle    You may be retaining fluid if you have a history of heart failure or if you experience any of the following symptoms:  Weight gain of 3 pounds or more overnight or 5 pounds in a week, increased swelling in our hands or feet or shortness of breath while lying flat in bed. Please call your doctor as soon as you notice any of these symptoms; do not wait until your next office visit. Recognize signs and symptoms of STROKE:    F-face looks uneven    A-arms unable to move or move unevenly    S-speech slurred or non-existent    T-time-call 911 as soon as signs and symptoms begin-DO NOT go       Back to bed or wait to see if you get better-TIME IS BRAIN. Warning Signs of HEART ATTACK     Call 911 if you have these symptoms:   Chest discomfort. Most heart attacks involve discomfort in the center of the chest that lasts more than a few minutes, or that goes away and comes back. It can feel like uncomfortable pressure, squeezing, fullness, or pain.  Discomfort in other areas of the upper body. Symptoms can include pain or discomfort in one or both arms, the back, neck, jaw, or stomach.  Shortness of breath with or without chest discomfort.  Other signs may include breaking out in a cold sweat, nausea, or lightheadedness. Don't wait more than five minutes to call 911 - MINUTES MATTER! Fast action can save your life. Calling 911 is almost always the fastest way to get lifesaving treatment. Emergency Medical Services staff can begin treatment when they arrive -- up to an hour sooner than if someone gets to the hospital by car. The discharge information has been reviewed with the patient. The patient verbalized understanding.     Discharge medications reviewed with the patient and appropriate educational materials and side effects teaching were provided. [facility] discussed with Daughter Maddie who works in hospital and  Willis who is cardiologist. Understands that given patients age and poor functional status at baseline and underlying dementia he would not be good candidate for intubation/MV and CPR. They are leaning towards DNR/DNI but have to discuss with Son Baron as well before making final decision. Maddie and Willis would get in touch with Baron and inform us back about decision.

## 2023-07-01 ENCOUNTER — APPOINTMENT (OUTPATIENT)
Dept: GENERAL RADIOLOGY | Age: 48
End: 2023-07-01
Payer: COMMERCIAL

## 2023-07-01 ENCOUNTER — HOSPITAL ENCOUNTER (EMERGENCY)
Age: 48
Discharge: HOME | End: 2023-07-01
Attending: EMERGENCY MEDICINE
Payer: COMMERCIAL

## 2023-07-01 VITALS
BODY MASS INDEX: 21.34 KG/M2 | OXYGEN SATURATION: 98 % | HEIGHT: 64 IN | RESPIRATION RATE: 18 BRPM | DIASTOLIC BLOOD PRESSURE: 80 MMHG | WEIGHT: 125 LBS | TEMPERATURE: 98.8 F | SYSTOLIC BLOOD PRESSURE: 162 MMHG | HEART RATE: 73 BPM

## 2023-07-01 DIAGNOSIS — S93.401A SPRAIN OF RIGHT ANKLE, UNSPECIFIED LIGAMENT, INITIAL ENCOUNTER: Primary | ICD-10-CM

## 2023-07-01 PROBLEM — F90.9 ADULT ADHD: Status: ACTIVE | Noted: 2021-03-31

## 2023-07-01 PROBLEM — F41.9 ANXIETY: Status: ACTIVE | Noted: 2020-07-15

## 2023-07-01 PROBLEM — F43.22 ADJUSTMENT DISORDER WITH ANXIETY: Status: ACTIVE | Noted: 2017-01-18

## 2023-07-01 PROBLEM — Z15.89 MTHFR GENE MUTATION: Status: ACTIVE | Noted: 2020-10-26

## 2023-07-01 PROBLEM — E53.8 VITAMIN B12 DEFICIENCY: Status: ACTIVE | Noted: 2020-10-26

## 2023-07-01 PROCEDURE — 73610 X-RAY EXAM OF ANKLE: CPT

## 2023-07-01 PROCEDURE — 73590 X-RAY EXAM OF LOWER LEG: CPT

## 2023-07-01 PROCEDURE — 99283 EMERGENCY DEPT VISIT LOW MDM: CPT

## 2023-07-01 ASSESSMENT — LIFESTYLE VARIABLES
HOW OFTEN DO YOU HAVE A DRINK CONTAINING ALCOHOL: 2-4 TIMES A MONTH
HOW MANY STANDARD DRINKS CONTAINING ALCOHOL DO YOU HAVE ON A TYPICAL DAY: 1 OR 2

## 2023-07-01 ASSESSMENT — PAIN - FUNCTIONAL ASSESSMENT: PAIN_FUNCTIONAL_ASSESSMENT: NONE - DENIES PAIN

## 2023-07-01 ASSESSMENT — PAIN SCALES - GENERAL: PAINLEVEL_OUTOF10: 0

## 2023-07-29 ENCOUNTER — APPOINTMENT (OUTPATIENT)
Dept: CT IMAGING | Age: 48
End: 2023-07-29
Payer: COMMERCIAL

## 2023-07-29 ENCOUNTER — HOSPITAL ENCOUNTER (EMERGENCY)
Age: 48
Discharge: HOME | End: 2023-07-29
Attending: EMERGENCY MEDICINE
Payer: COMMERCIAL

## 2023-07-29 VITALS
SYSTOLIC BLOOD PRESSURE: 134 MMHG | HEIGHT: 64 IN | RESPIRATION RATE: 20 BRPM | TEMPERATURE: 97.6 F | WEIGHT: 128 LBS | DIASTOLIC BLOOD PRESSURE: 63 MMHG | HEART RATE: 53 BPM | BODY MASS INDEX: 21.85 KG/M2 | OXYGEN SATURATION: 100 %

## 2023-07-29 DIAGNOSIS — R73.9 HYPERGLYCEMIA: ICD-10-CM

## 2023-07-29 DIAGNOSIS — N23 RENAL COLIC: Primary | ICD-10-CM

## 2023-07-29 DIAGNOSIS — N17.9 AKI (ACUTE KIDNEY INJURY) (HCC): ICD-10-CM

## 2023-07-29 LAB
ALBUMIN SERPL-MCNC: 3.5 G/DL (ref 3.5–5)
ALBUMIN/GLOB SERPL: 0.9 (ref 0.4–1.6)
ALP SERPL-CCNC: 62 U/L (ref 50–136)
ALT SERPL-CCNC: 31 U/L (ref 12–65)
ANION GAP SERPL CALC-SCNC: 11 MMOL/L (ref 2–11)
APPEARANCE UR: ABNORMAL
AST SERPL-CCNC: 34 U/L (ref 15–37)
BACTERIA URNS QL MICRO: 0 /HPF
BASOPHILS # BLD: 0.1 K/UL (ref 0–0.2)
BASOPHILS NFR BLD: 1 % (ref 0–2)
BILIRUB SERPL-MCNC: 0.2 MG/DL (ref 0.2–1.1)
BILIRUB UR QL: NEGATIVE
BUN SERPL-MCNC: 21 MG/DL (ref 6–23)
CALCIUM SERPL-MCNC: 8.9 MG/DL (ref 8.3–10.4)
CHLORIDE SERPL-SCNC: 109 MMOL/L (ref 101–110)
CO2 SERPL-SCNC: 22 MMOL/L (ref 21–32)
COLOR UR: ABNORMAL
CREAT SERPL-MCNC: 1.21 MG/DL (ref 0.6–1)
DIFFERENTIAL METHOD BLD: ABNORMAL
EOSINOPHIL # BLD: 0.4 K/UL (ref 0–0.8)
EOSINOPHIL NFR BLD: 2 % (ref 0.5–7.8)
EPI CELLS #/AREA URNS HPF: ABNORMAL /HPF
ERYTHROCYTE [DISTWIDTH] IN BLOOD BY AUTOMATED COUNT: 13 % (ref 11.9–14.6)
GLOBULIN SER CALC-MCNC: 3.8 G/DL (ref 2.8–4.5)
GLUCOSE SERPL-MCNC: 200 MG/DL (ref 65–100)
GLUCOSE UR STRIP.AUTO-MCNC: NEGATIVE MG/DL
HCT VFR BLD AUTO: 39.5 % (ref 35.8–46.3)
HGB BLD-MCNC: 12.6 G/DL (ref 11.7–15.4)
HGB UR QL STRIP: ABNORMAL
IMM GRANULOCYTES # BLD AUTO: 0.1 K/UL (ref 0–0.5)
IMM GRANULOCYTES NFR BLD AUTO: 1 % (ref 0–5)
KETONES UR QL STRIP.AUTO: 15 MG/DL
LEUKOCYTE ESTERASE UR QL STRIP.AUTO: ABNORMAL
LIPASE SERPL-CCNC: 138 U/L (ref 73–393)
LYMPHOCYTES # BLD: 2.9 K/UL (ref 0.5–4.6)
LYMPHOCYTES NFR BLD: 17 % (ref 13–44)
MCH RBC QN AUTO: 28.4 PG (ref 26.1–32.9)
MCHC RBC AUTO-ENTMCNC: 31.9 G/DL (ref 31.4–35)
MCV RBC AUTO: 89.2 FL (ref 82–102)
MONOCYTES # BLD: 0.6 K/UL (ref 0.1–1.3)
MONOCYTES NFR BLD: 4 % (ref 4–12)
NEUTS SEG # BLD: 12.9 K/UL (ref 1.7–8.2)
NEUTS SEG NFR BLD: 76 % (ref 43–78)
NITRITE UR QL STRIP.AUTO: NEGATIVE
NRBC # BLD: 0 K/UL (ref 0–0.2)
OTHER OBSERVATIONS: ABNORMAL
PH UR STRIP: 6.5 (ref 5–9)
PLATELET # BLD AUTO: 344 K/UL (ref 150–450)
PMV BLD AUTO: 9.8 FL (ref 9.4–12.3)
POTASSIUM SERPL-SCNC: 3.5 MMOL/L (ref 3.5–5.1)
PROT SERPL-MCNC: 7.3 G/DL (ref 6.3–8.2)
PROT UR STRIP-MCNC: 30 MG/DL
RBC # BLD AUTO: 4.43 M/UL (ref 4.05–5.2)
RBC #/AREA URNS HPF: ABNORMAL /HPF
SODIUM SERPL-SCNC: 142 MMOL/L (ref 133–143)
SP GR UR REFRACTOMETRY: 1.02 (ref 1–1.02)
UROBILINOGEN UR QL STRIP.AUTO: 1 EU/DL (ref 0.2–1)
WBC # BLD AUTO: 17 K/UL (ref 4.3–11.1)
WBC URNS QL MICRO: ABNORMAL /HPF

## 2023-07-29 PROCEDURE — 80053 COMPREHEN METABOLIC PANEL: CPT

## 2023-07-29 PROCEDURE — 85025 COMPLETE CBC W/AUTO DIFF WBC: CPT

## 2023-07-29 PROCEDURE — 6360000002 HC RX W HCPCS: Performed by: EMERGENCY MEDICINE

## 2023-07-29 PROCEDURE — 2580000003 HC RX 258: Performed by: EMERGENCY MEDICINE

## 2023-07-29 PROCEDURE — 96375 TX/PRO/DX INJ NEW DRUG ADDON: CPT

## 2023-07-29 PROCEDURE — 99284 EMERGENCY DEPT VISIT MOD MDM: CPT

## 2023-07-29 PROCEDURE — 96374 THER/PROPH/DIAG INJ IV PUSH: CPT

## 2023-07-29 PROCEDURE — 83690 ASSAY OF LIPASE: CPT

## 2023-07-29 PROCEDURE — 81001 URINALYSIS AUTO W/SCOPE: CPT

## 2023-07-29 PROCEDURE — 74176 CT ABD & PELVIS W/O CONTRAST: CPT

## 2023-07-29 RX ORDER — ONDANSETRON 2 MG/ML
4 INJECTION INTRAMUSCULAR; INTRAVENOUS
Status: COMPLETED | OUTPATIENT
Start: 2023-07-29 | End: 2023-07-29

## 2023-07-29 RX ORDER — ONDANSETRON 4 MG/1
4 TABLET, FILM COATED ORAL 3 TIMES DAILY PRN
Qty: 15 TABLET | Refills: 2 | Status: SHIPPED | OUTPATIENT
Start: 2023-07-29

## 2023-07-29 RX ORDER — KETOROLAC TROMETHAMINE 10 MG/1
10 TABLET, FILM COATED ORAL EVERY 6 HOURS PRN
Qty: 10 TABLET | Refills: 0 | Status: SHIPPED | OUTPATIENT
Start: 2023-07-29

## 2023-07-29 RX ORDER — KETOROLAC TROMETHAMINE 15 MG/ML
15 INJECTION, SOLUTION INTRAMUSCULAR; INTRAVENOUS ONCE
Status: COMPLETED | OUTPATIENT
Start: 2023-07-29 | End: 2023-07-29

## 2023-07-29 RX ORDER — SODIUM CHLORIDE, SODIUM LACTATE, POTASSIUM CHLORIDE, AND CALCIUM CHLORIDE .6; .31; .03; .02 G/100ML; G/100ML; G/100ML; G/100ML
1000 INJECTION, SOLUTION INTRAVENOUS
Status: COMPLETED | OUTPATIENT
Start: 2023-07-29 | End: 2023-07-29

## 2023-07-29 RX ADMIN — ONDANSETRON 4 MG: 2 INJECTION INTRAMUSCULAR; INTRAVENOUS at 04:28

## 2023-07-29 RX ADMIN — KETOROLAC TROMETHAMINE 15 MG: 15 INJECTION, SOLUTION INTRAMUSCULAR; INTRAVENOUS at 05:53

## 2023-07-29 RX ADMIN — HYDROMORPHONE HYDROCHLORIDE 1 MG: 1 INJECTION, SOLUTION INTRAMUSCULAR; INTRAVENOUS; SUBCUTANEOUS at 04:27

## 2023-07-29 RX ADMIN — SODIUM CHLORIDE, POTASSIUM CHLORIDE, SODIUM LACTATE AND CALCIUM CHLORIDE 1000 ML: 600; 310; 30; 20 INJECTION, SOLUTION INTRAVENOUS at 05:16

## 2023-07-29 ASSESSMENT — PAIN SCALES - GENERAL: PAINLEVEL_OUTOF10: 10

## 2023-07-29 ASSESSMENT — ENCOUNTER SYMPTOMS
SHORTNESS OF BREATH: 0
VOMITING: 1
FACIAL SWELLING: 0
NAUSEA: 1
ABDOMINAL PAIN: 1
DIARRHEA: 1

## 2023-07-29 ASSESSMENT — PAIN - FUNCTIONAL ASSESSMENT: PAIN_FUNCTIONAL_ASSESSMENT: 0-10

## 2023-07-29 ASSESSMENT — LIFESTYLE VARIABLES
HOW OFTEN DO YOU HAVE A DRINK CONTAINING ALCOHOL: NEVER
HOW MANY STANDARD DRINKS CONTAINING ALCOHOL DO YOU HAVE ON A TYPICAL DAY: PATIENT DOES NOT DRINK

## 2023-07-29 NOTE — DISCHARGE INSTRUCTIONS
Follow-up with urology as needed. Follow-up with your doctor for recheck blood sugar as it was elevated today. Return for worsening concerning symptoms.

## 2024-08-24 ENCOUNTER — HOSPITAL ENCOUNTER (OUTPATIENT)
Dept: MAMMOGRAPHY | Age: 49
End: 2024-08-24
Payer: COMMERCIAL

## 2024-08-24 DIAGNOSIS — Z12.31 ENCOUNTER FOR SCREENING MAMMOGRAM FOR MALIGNANT NEOPLASM OF BREAST: ICD-10-CM

## 2024-08-24 PROCEDURE — 77063 BREAST TOMOSYNTHESIS BI: CPT

## (undated) DEVICE — MASTISOL ADHESIVE LIQ 2/3ML

## (undated) DEVICE — TRAY PREP DRY W/ PREM GLV 2 APPL 6 SPNG 2 UNDPD 1 OVERWRAP

## (undated) DEVICE — CYSTO/BLADDER IRRIGATION SET, REGULATING CLAMP

## (undated) DEVICE — 2000CC GUARDIAN II: Brand: GUARDIAN

## (undated) DEVICE — 2.1MM CROSS-CUT FISSURE CARBIDE BUR

## (undated) DEVICE — CARDINAL HEALTH FLEXIBLE LIGHT HANDLE COVER: Brand: CARDINAL HEALTH

## (undated) DEVICE — SUTURE VCRL SZ 3-0 L27IN ABSRB UD L19MM PS-2 3/8 CIR PRIM J427H

## (undated) DEVICE — SUT PROL 5-0 18IN P3 BLU --

## (undated) DEVICE — DRAPE TWL SURG 16X26IN BLU ORB04] ALLCARE INC]

## (undated) DEVICE — E-Z CLEAN, NON-STICK, PTFE COATED, MEGA FINE ELECTROSURGICAL NEEDLE ELECTRODE, SHARP, 2.5 INCH (6.3 CM): Brand: MEGADYNE

## (undated) DEVICE — SYR 50ML LR LCK 1ML GRAD NSAF --

## (undated) DEVICE — AMD ANTIMICROBIAL GAUZE SPONGES,12 PLY USP TYPE VII, 0.2% POLYHEXAMETHYLENE BIGUANIDE HCI (PHMB): Brand: CURITY

## (undated) DEVICE — GOWN,REINF,POLY,ECL,PP SLV,XL: Brand: MEDLINE

## (undated) DEVICE — MAGNETIC DRAPE: Brand: DEVON

## (undated) DEVICE — SOLUTION IV 1000ML 0.9% SOD CHL

## (undated) DEVICE — SUT SLK 2-0 18IN FS BLK --

## (undated) DEVICE — 3M™ TEGADERM™ TRANSPARENT FILM DRESSING FRAME STYLE, 1627, 4 IN X 10 IN (10 CM X 25 CM), 20/CT 4CT/CASE: Brand: 3M™ TEGADERM™

## (undated) DEVICE — TRAY CATH 16F DRN BG LTX -- CONVERT TO ITEM 363158

## (undated) DEVICE — PACKING 8004008 NEURAY 200PK 25X76MM: Brand: NEURAY ®

## (undated) DEVICE — SUT CHRMC 3-0 27IN SH BRN --

## (undated) DEVICE — PRECISION THIN (22.5 X 0.38MM)

## (undated) DEVICE — 4.0MM EGG BUR

## (undated) DEVICE — REM POLYHESIVE ADULT PATIENT RETURN ELECTRODE: Brand: VALLEYLAB

## (undated) DEVICE — BIT DRL L50MM DIA1.5MM STP L7MM RED S STL TWST J NOTCH

## (undated) DEVICE — (D)STRIP SKN CLSR 0.5X4IN WHT --

## (undated) DEVICE — KENDALL SCD EXPRESS SLEEVES, KNEE LENGTH, MEDIUM: Brand: KENDALL SCD

## (undated) DEVICE — GOWN,REINFORCED,POLY,AURORA,XXLARGE,STR: Brand: MEDLINE

## (undated) DEVICE — BUTTON SWITCH PENCIL BLADE ELECTRODE, HOLSTER: Brand: EDGE

## (undated) DEVICE — DRAPE SHT 3 QTR PROXIMA 53X77 --

## (undated) DEVICE — SUTURE VCRL SZ 3-0 L18IN ABSRB UD PS-2 L19MM 3/8 CRV PRIM J497H

## (undated) DEVICE — SUTURE PERMAHAND SZ 2-0 L18IN NONABSORBABLE BLK L26MM PS 1588H

## (undated) DEVICE — SUTURE VCRL SZ 4-0 L18IN ABSRB UD L19MM PS-2 3/8 CIR PRIM J496H

## (undated) DEVICE — 3.0MM DIAMOND ROUND BUR

## (undated) DEVICE — SUTURE MERS SZ 4-0 L18IN NONABSORBABLE WHT L19MM FS2 3/8 R633H

## (undated) DEVICE — SYRINGE NDL 25GA 1ML L5/8IN BLU PLAS NDL S STL SHLD HYPO

## (undated) DEVICE — Device

## (undated) DEVICE — APPLIER CLP AUTO MED 9.75 IN TI SURGCLP SUPER INTLOK 20 DISP

## (undated) DEVICE — ADHESIVE TOP BENZ TINC SWABSTK --

## (undated) DEVICE — SOLUTION IRRIG 3000ML 0.9% SOD CHL FLX CONT 0797208] ICU MEDICAL INC]

## (undated) DEVICE — JAW BRA SURG TMJ VELC CLSR SYS V CUT SUPP STRP 2 ZIPLOK BG

## (undated) DEVICE — X-RAY SPONGES,12 PLY: Brand: DERMACEA

## (undated) DEVICE — HEAD AND NECK: Brand: MEDLINE INDUSTRIES, INC.

## (undated) DEVICE — BIT DRL L115MM DIA1.5MM RED S STL TWST J NOTCH W/O STP

## (undated) DEVICE — APPLIER CLP L9.38IN M LIG TI DISP STR RNG HNDL LIGACLP